# Patient Record
Sex: FEMALE | Race: WHITE | NOT HISPANIC OR LATINO | Employment: OTHER | URBAN - METROPOLITAN AREA
[De-identification: names, ages, dates, MRNs, and addresses within clinical notes are randomized per-mention and may not be internally consistent; named-entity substitution may affect disease eponyms.]

---

## 2017-08-03 ENCOUNTER — TRANSCRIBE ORDERS (OUTPATIENT)
Dept: ADMINISTRATIVE | Facility: HOSPITAL | Age: 42
End: 2017-08-03

## 2017-08-03 DIAGNOSIS — Z12.31 VISIT FOR SCREENING MAMMOGRAM: Primary | ICD-10-CM

## 2020-01-05 ENCOUNTER — OFFICE VISIT (OUTPATIENT)
Dept: URGENT CARE | Age: 45
End: 2020-01-05
Payer: MEDICARE

## 2020-01-05 VITALS
HEART RATE: 75 BPM | HEIGHT: 66 IN | DIASTOLIC BLOOD PRESSURE: 76 MMHG | RESPIRATION RATE: 18 BRPM | WEIGHT: 184.6 LBS | OXYGEN SATURATION: 95 % | BODY MASS INDEX: 29.67 KG/M2 | SYSTOLIC BLOOD PRESSURE: 115 MMHG | TEMPERATURE: 98.5 F

## 2020-01-05 DIAGNOSIS — B96.89 ACUTE BACTERIAL BRONCHITIS: Primary | ICD-10-CM

## 2020-01-05 DIAGNOSIS — J20.8 ACUTE BACTERIAL BRONCHITIS: Primary | ICD-10-CM

## 2020-01-05 PROCEDURE — G0463 HOSPITAL OUTPT CLINIC VISIT: HCPCS | Performed by: PHYSICIAN ASSISTANT

## 2020-01-05 PROCEDURE — 99213 OFFICE O/P EST LOW 20 MIN: CPT | Performed by: PHYSICIAN ASSISTANT

## 2020-01-05 RX ORDER — HYDROXYZINE HYDROCHLORIDE 25 MG/1
25-50 TABLET, FILM COATED ORAL EVERY 6 HOURS PRN
COMMUNITY
Start: 2019-11-27

## 2020-01-05 RX ORDER — BENZONATATE 200 MG/1
200 CAPSULE ORAL 3 TIMES DAILY PRN
Qty: 20 CAPSULE | Refills: 0 | Status: SHIPPED | OUTPATIENT
Start: 2020-01-05

## 2020-01-05 RX ORDER — LEVOTHYROXINE SODIUM 137 UG/1
TABLET ORAL
COMMUNITY
Start: 2019-10-21

## 2020-01-05 RX ORDER — ARIPIPRAZOLE 5 MG/1
5 TABLET ORAL DAILY
COMMUNITY
Start: 2019-11-27 | End: 2022-07-06

## 2020-01-05 RX ORDER — DICYCLOMINE HCL 20 MG
20 TABLET ORAL 3 TIMES DAILY
COMMUNITY
Start: 2019-12-11

## 2020-01-05 RX ORDER — METOPROLOL SUCCINATE 25 MG/1
25 TABLET, EXTENDED RELEASE ORAL 2 TIMES DAILY
COMMUNITY
Start: 2019-10-25 | End: 2022-07-06

## 2020-01-05 RX ORDER — CLONAZEPAM 0.12 MG/1
TABLET, ORALLY DISINTEGRATING ORAL
COMMUNITY
Start: 2019-11-27 | End: 2022-03-25

## 2020-01-05 RX ORDER — PREDNISONE 20 MG/1
40 TABLET ORAL DAILY
Qty: 10 TABLET | Refills: 0 | Status: SHIPPED | OUTPATIENT
Start: 2020-01-05 | End: 2020-01-10

## 2020-01-05 RX ORDER — FLECAINIDE ACETATE 50 MG/1
150 TABLET ORAL
COMMUNITY
Start: 2019-07-11

## 2020-01-05 RX ORDER — PREGABALIN 100 MG/1
CAPSULE ORAL
COMMUNITY
Start: 2019-12-08 | End: 2022-03-25

## 2020-01-05 RX ORDER — ALBUTEROL SULFATE 90 UG/1
2 AEROSOL, METERED RESPIRATORY (INHALATION) EVERY 6 HOURS PRN
Qty: 18 G | Refills: 0 | Status: SHIPPED | OUTPATIENT
Start: 2020-01-05

## 2020-01-05 RX ORDER — AMOXICILLIN AND CLAVULANATE POTASSIUM 875; 125 MG/1; MG/1
1 TABLET, FILM COATED ORAL EVERY 12 HOURS SCHEDULED
Qty: 14 TABLET | Refills: 0 | Status: SHIPPED | OUTPATIENT
Start: 2020-01-05 | End: 2020-01-12

## 2020-01-05 RX ORDER — PANTOPRAZOLE SODIUM 40 MG/1
1 TABLET, DELAYED RELEASE ORAL DAILY
COMMUNITY
Start: 2018-09-25

## 2020-01-05 NOTE — PATIENT INSTRUCTIONS
Continue to monitor symptoms  If new or worsening symptoms develop, go immediately to Er  Drink plenty of fluids  Follow up with Family Doctor this week  Acute Bronchitis   WHAT YOU NEED TO KNOW:   Acute bronchitis is swelling and irritation in the air passages of your lungs  This irritation may cause you to cough or have other breathing problems  Acute bronchitis often starts because of another illness, such as a cold or the flu  The illness spreads from your nose and throat to your windpipe and airways  Bronchitis is often called a chest cold  Acute bronchitis lasts about 3 to 6 weeks and is usually not a serious illness  Your cough can last for several weeks  DISCHARGE INSTRUCTIONS:   Return to the emergency department if:   · You cough up blood  · Your lips or fingernails turn blue  · You feel like you are not getting enough air when you breathe  Contact your healthcare provider if:   · You have a fever  · Your breathing problems do not go away or get worse  · Your cough does not get better within 4 weeks  · You have questions or concerns about your condition or care  Self-care:   · Get more rest   Rest helps your body to heal  Slowly start to do more each day  Rest when you feel it is needed  · Avoid irritants in the air  Avoid chemicals, fumes, and dust  Wear a face mask if you must work around dust or fumes  Stay inside on days when air pollution levels are high  If you have allergies, stay inside when pollen counts are high  Do not use aerosol products, such as spray-on deodorant, bug spray, and hair spray  · Do not smoke or be around others who smoke  Nicotine and other chemicals in cigarettes and cigars damages the cilia that move mucus out of your lungs  Ask your healthcare provider for information if you currently smoke and need help to quit  E-cigarettes or smokeless tobacco still contain nicotine  Talk to your healthcare provider before you use these products       · Drink liquids as directed  Liquids help keep your air passages moist and help you cough up mucus  You may need to drink more liquids when you have acute bronchitis  Ask how much liquid to drink each day and which liquids are best for you  · Use a humidifier or vaporizer  Use a cool mist humidifier or a vaporizer to increase air moisture in your home  This may make it easier for you to breathe and help decrease your cough  Decrease risk for acute bronchitis:   · Get the vaccinations you need  Ask your healthcare provider if you should get vaccinated against the flu or pneumonia  · Prevent the spread of germs  You can decrease your risk of acute bronchitis and other illnesses by doing the following:     [facility] [place] often [place] soap and water  Carry germ-killing hand lotion or gel with you  You can use the lotion or gel to clean your hands when soap and water are not available  ¨ Do not touch your eyes, nose, or mouth unless you have washed your hands first     ¨ Always cover your mouth when you cough to prevent the spread of germs  It is best to cough into a tissue or your shirt sleeve instead of into your hand  Ask those around you cover their mouths when they cough  ¨ Try to avoid people who have a cold or the flu  If you are sick, stay away from others as much as possible  Medicines: Your healthcare provider may  give you any of the following:  · Ibuprofen or acetaminophen  are medicines that help lower your fever  They are available without a doctor's order  Ask your healthcare provider which medicine is right for you  Ask how much to take and how often to take it  Follow directions  These medicines can cause stomach bleeding if not taken correctly  Ibuprofen can cause kidney damage  Do not take ibuprofen if you have kidney disease, an ulcer, or allergies to aspirin  Acetaminophen can cause liver damage  Do not take more than 4,000 milligrams in 24 hours       · Decongestants  help loosen mucus in your lungs and make it easier to cough up  This can help you breathe easier  · Cough suppressants  decrease your urge to cough  If your cough produces mucus, do not take a cough suppressant unless your healthcare provider tells you to  Your healthcare provider may suggest that you take a cough suppressant at night so you can rest     · Inhalers  may be given  Your healthcare provider may give you one or more inhalers to help you breathe easier and cough less  An inhaler gives your medicine to open your airways  Ask your healthcare provider to show you how to use your inhaler correctly  · Take your medicine as directed  Contact your healthcare provider if you think your medicine is not helping or if you have side effects  Tell him of her if you are allergic to any medicine  Keep a list of the medicines, vitamins, and herbs you take  Include the amounts, and when and why you take them  Bring the list or the pill bottles to follow-up visits  Carry your medicine list with you in case of an emergency  Follow up with your healthcare provider as directed:  Write down questions you have so you will remember to ask them during your follow-up visits  © 2017 [employer] Information is for End User's use only and may not be sold, redistributed or otherwise used for commercial purposes  All illustrations and images included in CareNotes® are the copyrighted property of [employer]  or Malachi Barbosa  The above information is an  only  It is not intended as medical advice for individual conditions or treatments  Talk to your doctor, nurse or pharmacist before following any medical regimen to see if it is safe and effective for you

## 2020-01-05 NOTE — PROGRESS NOTES
[facility]        NAME: Ernesto Macdonald is a 53 y o  female  : 1975    MRN: [id number]  DATE: 2020  TIME: 2:59 PM    Assessment and Plan   Acute bacterial bronchitis [J20 8, B96 89]  1  Acute bacterial bronchitis  amoxicillin-clavulanate (AUGMENTIN) 875-125 mg per tablet    predniSONE 20 mg tablet    albuterol (VENTOLIN HFA) 90 mcg/act inhaler    benzonatate (TESSALON) 200 MG capsule         Patient Instructions       Continue to monitor symptoms  If new or worsening symptoms develop, go immediately to Er  Drink plenty of fluids  Follow up with Family Doctor this week  Chief Complaint     Chief Complaint   Patient presents with    Cold Like Symptoms     Patient reports body aches, chest congestion, moist productive cough, symptom onset Thursday  No fevers, nausea or vomiting  APAP, advil OTC AT 1000          History of Present Illness       Cough   This is a new problem  Episode onset: 3-4 days  The problem has been gradually worsening  The problem occurs every few minutes  The cough is non-productive  Associated symptoms include postnasal drip and a sore throat  Pertinent negatives include no chest pain, chills, ear pain, fever, headaches, myalgias, rash, rhinorrhea, shortness of breath or wheezing  Risk factors for lung disease include smoking/tobacco exposure (>30 years)  Treatments tried: tylenol, advil  The treatment provided mild relief  Review of Systems   Review of Systems   Constitutional: Negative for chills, diaphoresis, fatigue and fever  HENT: Positive for postnasal drip, sinus pressure, sinus pain, sneezing and sore throat  Negative for congestion, ear pain, rhinorrhea and voice change  Eyes: Negative  Respiratory: Positive for cough  Negative for chest tightness, shortness of breath and wheezing  Cardiovascular: Negative for chest pain and palpitations  Gastrointestinal: Negative for abdominal pain, constipation, diarrhea, nausea and vomiting  Endocrine: Negative  Genitourinary: Negative for dysuria  Musculoskeletal: Negative for back pain, myalgias and neck pain  Skin: Negative for pallor and rash  Allergic/Immunologic: Negative  Neurological: Negative for dizziness, syncope and headaches  Hematological: Negative  Psychiatric/Behavioral: Negative  Current Medications       Current Outpatient Medications:     ARIPiprazole (ABILIFY) 5 mg tablet, Take 5 mg by mouth daily, Disp: , Rfl:     asenapine (SAPHRIS) 5 mg SL tablet, Place 5 mg under the tongue daily at bedtime  , Disp: , Rfl:     baclofen 10 mg tablet, Take 10 mg by mouth 3 (three) times a day , Disp: , Rfl:     clonazePAM (KlonoPIN) 0 125 mg disintegrating tablet, place 1 tablet by mouth NIGHTLY AS NEEDED FOR SEIZURES, Disp: , Rfl:     cycloSPORINE (RESTASIS) 0 05 % ophthalmic emulsion, Administer 1 drop to both eyes 2 (two) times a day , Disp: , Rfl:     dicyclomine (BENTYL) 20 mg tablet, Take 20 mg by mouth 3 (three) times a day, Disp: , Rfl:     flecainide (TAMBOCOR) 50 mg tablet, take 1 tablet by mouth twice a day, Disp: , Rfl:     gabapentin (NEURONTIN) 300 mg capsule, Take 300 mg by mouth 3 (three) times a day , Disp: , Rfl:     hydrOXYzine HCL (ATARAX) 25 mg tablet, Take 25-50 mg by mouth every 6 (six) hours as needed, Disp: , Rfl:     levothyroxine 137 mcg tablet, , Disp: , Rfl:     metoprolol succinate (TOPROL-XL) 25 mg 24 hr tablet, Take 25 mg by mouth 2 (two) times a day, Disp: , Rfl:     oxyCODONE-acetaminophen (PERCOCET) 7 5-325 MG per tablet, Take 1 tablet by mouth every 6 (six) hours as needed for moderate pain , Disp: , Rfl:     pantoprazole (PROTONIX) 40 mg tablet, Take 1 tablet by mouth daily, Disp: , Rfl:     pregabalin (LYRICA) 150 mg capsule, Take 150 mg by mouth 2 (two) times a day , Disp: , Rfl:     topiramate (TOPAMAX) 100 mg tablet, Take 100 mg by mouth 2 (two) times a day , Disp: , Rfl:     venlafaxine (EFFEXOR-XR) 150 mg 24 hr capsule, Take 150 mg by mouth daily  , Disp: , Rfl:     venlafaxine (EFFEXOR-XR) 37 5 mg 24 hr capsule, Take 37 5 mg by mouth daily  , Disp: , Rfl:     ZOLMitriptan (ZOMIG) 5 MG tablet, Take 5 mg by mouth once as needed for migraine  May repeat in 2 hours if unresolved  Do not exceed 10 mg in 24 hours  , Disp: , Rfl:     zolpidem (AMBIEN CR) 12 5 MG CR tablet, Take 10 mg by mouth daily at bedtime as needed for sleep , Disp: , Rfl:     albuterol (VENTOLIN HFA) 90 mcg/act inhaler, Inhale 2 puffs every 6 (six) hours as needed for wheezing, Disp: 18 g, Rfl: 0    amoxicillin-clavulanate (AUGMENTIN) 875-125 mg per tablet, Take 1 tablet by mouth every 12 (twelve) hours for 7 days, Disp: 14 tablet, Rfl: 0    benzonatate (TESSALON) 200 MG capsule, Take 1 capsule (200 mg total) by mouth 3 (three) times a day as needed for cough, Disp: 20 capsule, Rfl: 0    clonazePAM (KlonoPIN) 1 mg tablet, Take 1 mg by mouth 3 (three) times a day as needed for seizures  , Disp: , Rfl:     levothyroxine 100 mcg tablet, Take 112 mcg by mouth daily  , Disp: , Rfl:     morphine (MS CONTIN) 15 mg 12 hr tablet, Take 15 mg by mouth 2 (two) times a day , Disp: , Rfl:     predniSONE 20 mg tablet, Take 2 tablets (40 mg total) by mouth daily for 5 days, Disp: 10 tablet, Rfl: 0    pregabalin (LYRICA) 100 mg capsule, TAKE 1 CAPSULE BY MOUTH 2 TIMES DAILY  , Disp: , Rfl:     Current Allergies     Allergies as of 01/05/2020 - Reviewed 01/05/2020   Allergen Reaction Noted    Pollen extract  08/16/2017            The following portions of the patient's history were reviewed and updated as appropriate: allergies, current medications, past family history, past medical history, past social history, past surgical history and problem list      Past Medical History:   Diagnosis Date    Disease of thyroid gland     Inflammatory bowel disease        Past Surgical History:   Procedure Laterality Date    APPENDECTOMY      TOTAL THYROIDECTOMY         History reviewed  No pertinent family history  Medications have been verified  Objective   /76   Pulse 75   Temp 98 5 °F (36 9 °C) (Tympanic)   Resp 18   Ht 5' 6" (1 676 m)   Wt 83 7 kg (184 lb 9 6 oz)   SpO2 95%   BMI 29 80 kg/m²        Physical Exam     Physical Exam   Constitutional: She appears well-developed and well-nourished  No distress  HENT:   Head: Normocephalic and atraumatic  Right Ear: Hearing, external ear and ear canal normal  Tympanic membrane is bulging  Tympanic membrane is not perforated and not erythematous  Left Ear: Hearing, external ear and ear canal normal  Tympanic membrane is bulging  Tympanic membrane is not perforated and not erythematous  Nose: Mucosal edema present  Right sinus exhibits no maxillary sinus tenderness and no frontal sinus tenderness  Left sinus exhibits no maxillary sinus tenderness and no frontal sinus tenderness  Mouth/Throat: Posterior oropharyngeal erythema present  No oropharyngeal exudate or posterior oropharyngeal edema  Eyes: Conjunctivae are normal  Right eye exhibits no discharge  Left eye exhibits no discharge  Neck: Normal range of motion  Neck supple  Cardiovascular: Normal rate, regular rhythm, normal heart sounds and intact distal pulses  Pulmonary/Chest: Effort normal and breath sounds normal  No respiratory distress  She has no wheezes  She has no rales  Lymphadenopathy:     She has no cervical adenopathy  Skin: Skin is warm  No rash noted  She is not diaphoretic  Nursing note and vitals reviewed

## 2020-08-31 ENCOUNTER — HOSPITAL ENCOUNTER (OUTPATIENT)
Dept: RADIOLOGY | Facility: HOSPITAL | Age: 45
Discharge: HOME | End: 2020-08-31
Payer: MEDICARE

## 2020-08-31 DIAGNOSIS — K59.00 CONSTIPATION, UNSPECIFIED CONSTIPATION TYPE: ICD-10-CM

## 2020-08-31 PROCEDURE — 74022 RADEX COMPL AQT ABD SERIES: CPT

## 2020-09-03 ENCOUNTER — TELEPHONE (OUTPATIENT)
Dept: OTHER | Facility: OTHER | Age: 45
End: 2020-09-03

## 2020-09-03 NOTE — TELEPHONE ENCOUNTER
[facility] 1975/ Pt is experiencing abdominal pain  Has been for 2 weeks    Contacted via TC

## 2021-04-03 ENCOUNTER — TRANSCRIBE ORDERS (OUTPATIENT)
Dept: RADIOLOGY | Age: 46
End: 2021-04-03

## 2021-04-03 ENCOUNTER — APPOINTMENT (OUTPATIENT)
Dept: RADIOLOGY | Age: 46
End: 2021-04-03
Payer: MEDICARE

## 2021-04-03 DIAGNOSIS — M79.641 RIGHT HAND PAIN: ICD-10-CM

## 2021-04-03 DIAGNOSIS — M79.642 LEFT HAND PAIN: ICD-10-CM

## 2021-04-03 DIAGNOSIS — M79.642 LEFT HAND PAIN: Primary | ICD-10-CM

## 2021-04-03 PROCEDURE — 73130 X-RAY EXAM OF HAND: CPT

## 2021-10-12 ENCOUNTER — OFFICE VISIT (OUTPATIENT)
Dept: URGENT CARE | Age: 46
End: 2021-10-12
Payer: MEDICARE

## 2021-10-12 VITALS — BODY MASS INDEX: 22.76 KG/M2 | WEIGHT: 145 LBS | HEIGHT: 67 IN

## 2021-10-12 DIAGNOSIS — B34.9 ACUTE VIRAL SYNDROME: Primary | ICD-10-CM

## 2021-10-12 PROCEDURE — U0003 INFECTIOUS AGENT DETECTION BY NUCLEIC ACID (DNA OR RNA); SEVERE ACUTE RESPIRATORY SYNDROME CORONAVIRUS 2 (SARS-COV-2) (CORONAVIRUS DISEASE [COVID-19]), AMPLIFIED PROBE TECHNIQUE, MAKING USE OF HIGH THROUGHPUT TECHNOLOGIES AS DESCRIBED BY CMS-2020-01-R: HCPCS | Performed by: PHYSICIAN ASSISTANT

## 2021-10-12 PROCEDURE — U0005 INFEC AGEN DETEC AMPLI PROBE: HCPCS | Performed by: PHYSICIAN ASSISTANT

## 2021-10-12 PROCEDURE — 99213 OFFICE O/P EST LOW 20 MIN: CPT | Performed by: PHYSICIAN ASSISTANT

## 2021-10-12 PROCEDURE — G0463 HOSPITAL OUTPT CLINIC VISIT: HCPCS | Performed by: PHYSICIAN ASSISTANT

## 2021-10-13 LAB — SARS-COV-2 RNA RESP QL NAA+PROBE: NEGATIVE

## 2021-12-07 ENCOUNTER — OFFICE VISIT (OUTPATIENT)
Dept: URGENT CARE | Age: 46
End: 2021-12-07
Payer: MEDICARE

## 2021-12-07 VITALS
HEIGHT: 67 IN | OXYGEN SATURATION: 97 % | WEIGHT: 165 LBS | TEMPERATURE: 98.6 F | HEART RATE: 88 BPM | BODY MASS INDEX: 25.9 KG/M2 | RESPIRATION RATE: 17 BRPM

## 2021-12-07 DIAGNOSIS — J01.40 ACUTE NON-RECURRENT PANSINUSITIS: Primary | ICD-10-CM

## 2021-12-07 PROCEDURE — 99213 OFFICE O/P EST LOW 20 MIN: CPT | Performed by: PHYSICIAN ASSISTANT

## 2021-12-07 PROCEDURE — 0241U HB NFCT DS VIR RESP RNA 4 TRGT: CPT | Performed by: PHYSICIAN ASSISTANT

## 2021-12-07 PROCEDURE — G0463 HOSPITAL OUTPT CLINIC VISIT: HCPCS | Performed by: PHYSICIAN ASSISTANT

## 2021-12-07 RX ORDER — PREDNISONE 10 MG/1
TABLET ORAL
Qty: 15 TABLET | Refills: 0 | Status: SHIPPED | OUTPATIENT
Start: 2021-12-07 | End: 2021-12-12

## 2021-12-07 RX ORDER — AMOXICILLIN AND CLAVULANATE POTASSIUM 875; 125 MG/1; MG/1
1 TABLET, FILM COATED ORAL EVERY 12 HOURS SCHEDULED
Qty: 14 TABLET | Refills: 0 | Status: SHIPPED | OUTPATIENT
Start: 2021-12-07 | End: 2021-12-14

## 2021-12-07 RX ORDER — BROMPHENIRAMINE MALEATE, PSEUDOEPHEDRINE HYDROCHLORIDE, AND DEXTROMETHORPHAN HYDROBROMIDE 2; 30; 10 MG/5ML; MG/5ML; MG/5ML
10 SYRUP ORAL 4 TIMES DAILY PRN
Qty: 120 ML | Refills: 0 | Status: SHIPPED | OUTPATIENT
Start: 2021-12-07

## 2021-12-08 LAB
FLUAV RNA RESP QL NAA+PROBE: NEGATIVE
FLUBV RNA RESP QL NAA+PROBE: NEGATIVE
RSV RNA RESP QL NAA+PROBE: NEGATIVE
SARS-COV-2 RNA RESP QL NAA+PROBE: NEGATIVE

## 2022-03-25 ENCOUNTER — CONSULT (OUTPATIENT)
Dept: PAIN MEDICINE | Facility: CLINIC | Age: 47
End: 2022-03-25
Payer: MEDICARE

## 2022-03-25 VITALS
HEIGHT: 67 IN | WEIGHT: 175 LBS | HEART RATE: 68 BPM | OXYGEN SATURATION: 98 % | BODY MASS INDEX: 27.47 KG/M2 | SYSTOLIC BLOOD PRESSURE: 128 MMHG | DIASTOLIC BLOOD PRESSURE: 84 MMHG

## 2022-03-25 DIAGNOSIS — F31.9 BIPOLAR 1 DISORDER (HCC): ICD-10-CM

## 2022-03-25 DIAGNOSIS — Z79.891 LONG-TERM CURRENT USE OF OPIATE ANALGESIC: ICD-10-CM

## 2022-03-25 DIAGNOSIS — G89.4 CHRONIC PAIN SYNDROME: Primary | ICD-10-CM

## 2022-03-25 DIAGNOSIS — M48.062 SPINAL STENOSIS OF LUMBAR REGION WITH NEUROGENIC CLAUDICATION: ICD-10-CM

## 2022-03-25 DIAGNOSIS — M51.16 LUMBAR DISC DISEASE WITH RADICULOPATHY: ICD-10-CM

## 2022-03-25 PROCEDURE — 99204 OFFICE O/P NEW MOD 45 MIN: CPT | Performed by: PHYSICAL MEDICINE & REHABILITATION

## 2022-03-25 RX ORDER — PREGABALIN 150 MG/1
150 CAPSULE ORAL 2 TIMES DAILY
Qty: 60 CAPSULE | Refills: 1 | Status: SHIPPED | OUTPATIENT
Start: 2022-03-25 | End: 2022-05-19 | Stop reason: SDUPTHER

## 2022-03-25 NOTE — PATIENT INSTRUCTIONS
Lumbar Spinal Stenosis   WHAT YOU NEED TO KNOW:   Lumbar spinal stenosis is narrowing of the spinal canal in your lower back  Your spinal canal holds your spinal cord  The spinal cord controls your ability to move  When your spinal canal narrows, it may put pressure on your spinal cord  DISCHARGE INSTRUCTIONS:   Return to the emergency department if:   · You have pain in your leg that does not go away or gets worse  · You have trouble moving your legs  · You cannot control when you urinate or have a bowel movement  Contact your healthcare provider if:   · You have new or worsening symptoms  · Your symptoms keep you from doing your daily activities  · You have questions or concerns about your condition or care  Medicines:   · NSAIDs , such as ibuprofen, help decrease swelling, pain, and fever  NSAIDs can cause stomach bleeding or kidney problems in certain people  If you take blood thinner medicine, always ask your healthcare provider if NSAIDs are safe for you  Always read the medicine label and follow directions  · Acetaminophen  decreases pain and fever  It is available without a doctor's order  Ask how much to take and how often to take it  Follow directions  Read the labels of all other medicines you are using to see if they also contain acetaminophen, or ask your doctor or pharmacist  Acetaminophen can cause liver damage if not taken correctly  Do not use more than 4 grams (4,000 milligrams) total of acetaminophen in one day  · Prescription pain medicine  may be given  Ask how to take this medicine safely  · Muscle relaxers  help decrease pain and muscle spasms  · Take your medicine as directed  Contact your healthcare provider if you think your medicine is not helping or if you have side effects  Tell him or her if you are allergic to any medicine  Keep a list of the medicines, vitamins, and herbs you take  Include the amounts, and when and why you take them   Bring the list or the pill bottles to follow-up visits  Carry your medicine list with you in case of an emergency  Self-care:   · Rest  when you feel it is needed  Slowly start to do more each day  Return to your daily activities as directed  · Apply heat on your back for 20 to 30 minutes every 2 hours for as many days as directed  Heat helps decrease pain and muscle spasms  · Apply ice  on your back for 15 to 20 minutes every hour or as directed  Use an ice pack, or put crushed ice in a plastic bag  Cover it with a towel before you apply it to your skin  Ice helps prevent tissue damage and decreases swelling and pain  Go to physical and occupational therapy as directed:  A physical therapist teaches you exercises to help improve movement and strength, and to decrease pain  An occupational therapist teaches you skills to help with your daily activities  Follow up with your doctor as directed:  Write down your questions so you remember to ask them during your visits  © Copyright [employer] 2022 Information is for End User's use only and may not be sold, redistributed or otherwise used for commercial purposes  All illustrations and images included in CareNotes® are the copyrighted property of [employer]  or Kiran Diallo   The above information is an  only  It is not intended as medical advice for individual conditions or treatments  Talk to your doctor, nurse or pharmacist before following any medical regimen to see if it is safe and effective for you

## 2022-03-25 NOTE — PROGRESS NOTES
Assessment  1  Chronic pain syndrome    2  Spinal stenosis of lumbar region with neurogenic claudication    3  Lumbar disc disease with radiculopathy    4  Long-term current use of opiate analgesic    5  Bipolar 1 disorder [facility])        Plan  Ms Caterina Vizcaino is a pleasant 78-year-old female who presents for initial evaluation regarding chronic low back pain with intermittent radiating symptoms into bilateral lower extremities  She was previously being seen by Dr Claribel Castro who provided the patient with interventional approach to manage her pain as well as opiate medication management  During today's evaluation she continues to demonstrate clinical and diagnostic evidence of lumbar radiculopathy into bilateral lower extremities with MRI evidence from 2017 of multilevel degenerative disc disease, facet arthropathy, mild spinal stenosis at L4-L5 and myeloma bilateral foraminal stenosis at L5-S1  During today's evaluation extensive conversation regarding medication and interventional approach to manage her pain were discussed  Patient is aware I will not be taking over her opiate medications  At this time we will   1  Increase Lyrica to 150 mg twice a day   2  Will order MRI lumbar spine without contrast under anesthesia  Last MRI was done in 2017 demonstrating mild lumbar spinal stenosis  Patient has undergone greater than 6 weeks of chiropractic manipulation in the last 6 months with minimal relief  3  Would likely benefit from interventional approaches including epidural steroid injections but for now we will await MRI results    My impressions and treatment recommendations were discussed in detail with the patient who verbalized understanding and had no further questions  Discharge instructions were provided  I personally saw and examined the patient and I agree with the above discussed plan of care      Orders Placed This Encounter   Procedures    MRI lumbar spine without contrast     Standing Status:   Future Standing Expiration Date:   3/25/2026     Scheduling Instructions: There is no preparation for this test  Please leave your jewelry and valuables at home, wedding rings are the exception  Magnetic nail polish must be removed prior to arrival for your test  Please bring your insurance cards, a form of photo ID and a list of your medications with you  Arrive 15 minutes prior to your appointment time in order to register  Please bring any prior CT or MRI studies of this area that were not performed at a [facility]  To schedule this appointment, please contact Central Scheduling at [phone]  Prior to your appointment, please make sure you complete the MRI Screening Form when you e-Check in for your appointment  This will be available starting 7 days before your appointment in [address]  You may receive an e-mail with an activation code if you do not have a [email or web address] account  If you do not have access to a device, we will complete your screening at your appointment  Order Specific Question:   What is the patient's sedation requirement? Answer: Anesthesia     Order Specific Question:   Is the patient pregnant? Answer:   No     Order Specific Question:   Release to patient through [employer]     Answer:   Immediate     Order Specific Question:   Is order priority selected as STAT? Answer:   No     Order Specific Question:   Reason for Exam (FREE TEXT)     Answer:   lumbar spinal stenosis    Comprehensive metabolic panel     This is a patient instruction: Patient fasting for 8 hours or longer recommended       Standing Status:   Future     Standing Expiration Date:   3/25/2023     New Medications Ordered This Visit   Medications    pregabalin (LYRICA) 150 mg capsule     Sig: Take 1 capsule (150 mg total) by mouth 2 (two) times a day     Dispense:  60 capsule     Refill:  1       History of Present Illness    Claire Hauser is a 50 y o  female presents to [address] [place] [address] [facility] for initial evaluation regarding 10 years plus duration of mid to low back pain with symptoms in bilateral feet  Patient was previously following with Dr Martine Jernigan for pain management  Today reports moderate to severe pain rated 7/10 and interfering with daily activities  Pain is nearly constant 60-95% of the time that is worse in the afternoon and evening  Describes symptoms as burning, shooting, numbness, sharp, pins needles, throbbing, dull aching pain  Also reports lower extremity weakness but denies falls  Does not use any durable medical equipment for ambulation  Symptoms are worse with standing, bending, sitting, walking  Has had moderate relief with heat, biofeedback, 10s unit, chiropractic manipulation, nerve blocks and no significant improvements with physical therapy  Admits to social alcohol as well as smoking a pack per day for the last 15 years  Previously tried MS Contin, Vicodin, Percocet, tramadol with some relief in her pain presents today for initial evaluation  I have personally reviewed and/or updated the patient's past medical history, past surgical history, family history, social history, current medications, allergies, and vital signs today  Review of Systems   Constitutional: Negative for fever and unexpected weight change  HENT: Negative for trouble swallowing  Eyes: Negative for visual disturbance  Respiratory: Negative for shortness of breath and wheezing  Cardiovascular: Negative for chest pain and palpitations  Gastrointestinal: Positive for abdominal pain  Negative for constipation, diarrhea, nausea and vomiting  Endocrine: Negative for cold intolerance, heat intolerance and polydipsia  Genitourinary: Negative for difficulty urinating and frequency  Musculoskeletal: Positive for arthralgias, back pain, gait problem and myalgias  Negative for joint swelling  Skin: Negative for rash     Neurological: Negative for dizziness, seizures, syncope, weakness and headaches  Hematological: Does not bruise/bleed easily  Psychiatric/Behavioral: Negative for dysphoric mood  All other systems reviewed and are negative  Patient Active Problem List   Diagnosis    Bipolar 1 disorder ([facility] )       Past Medical History:   Diagnosis Date    Anxiety     Bipolar 1 disorder ([facility] )     Depression     Disease of thyroid gland     Fibromyalgia, primary     Inflammatory bowel disease     Thyroid disease        Past Surgical History:   Procedure Laterality Date    APPENDECTOMY      KNEE SURGERY      TOTAL THYROIDECTOMY         History reviewed  No pertinent family history  Social History     Occupational History    Not on file   Tobacco Use    Smoking status: Current Every Day Smoker     Packs/day: 0 50     Types: Cigarettes    Smokeless tobacco: Never Used   Vaping Use    Vaping Use: Never used   Substance and Sexual Activity    Alcohol use: Yes     Comment: socially     Drug use: No    Sexual activity: Not on file       Current Outpatient Medications on File Prior to Visit   Medication Sig    asenapine (SAPHRIS) 5 mg SL tablet Place 5 mg under the tongue daily at bedtime   baclofen 10 mg tablet Take 10 mg by mouth 3 (three) times a day   brompheniramine-pseudoephedrine-DM 30-2-10 MG/5ML syrup Take 10 mL by mouth 4 (four) times a day as needed for congestion or cough    cycloSPORINE (RESTASIS) 0 05 % ophthalmic emulsion Administer 1 drop to both eyes 2 (two) times a day   dicyclomine (BENTYL) 20 mg tablet Take 20 mg by mouth 3 (three) times a day    flecainide (TAMBOCOR) 50 mg tablet 150 mg     hydrOXYzine HCL (ATARAX) 25 mg tablet Take 25-50 mg by mouth every 6 (six) hours as needed    hyoscyamine (LEVSIN/SL) 0 125 mg SL tablet Take 1 tablet (0 125 mg total) by mouth every 6 (six) hours as needed for cramping    levothyroxine 100 mcg tablet Take 112 mcg by mouth daily      levothyroxine 137 mcg tablet     oxyCODONE-acetaminophen (PERCOCET) 7 5-325 MG per tablet Take 1 tablet by mouth every 6 (six) hours as needed for moderate pain   pantoprazole (PROTONIX) 40 mg tablet Take 1 tablet by mouth daily    topiramate (TOPAMAX) 100 mg tablet Take 100 mg by mouth 2 (two) times a day   venlafaxine (EFFEXOR-XR) 150 mg 24 hr capsule Take 150 mg by mouth daily   venlafaxine (EFFEXOR-XR) 37 5 mg 24 hr capsule Take 37 5 mg by mouth daily   ZOLMitriptan (ZOMIG) 5 MG tablet Take 5 mg by mouth once as needed for migraine  May repeat in 2 hours if unresolved  Do not exceed 10 mg in 24 hours   zolpidem (AMBIEN CR) 12 5 MG CR tablet Take 10 mg by mouth daily at bedtime as needed for sleep   [DISCONTINUED] clonazePAM (KlonoPIN) 0 125 mg disintegrating tablet place 1 tablet by mouth NIGHTLY AS NEEDED FOR SEIZURES    [DISCONTINUED] clonazePAM (KlonoPIN) 1 mg tablet Take 1 mg by mouth 3 (three) times a day as needed for seizures   [DISCONTINUED] gabapentin (NEURONTIN) 300 mg capsule Take 300 mg by mouth 3 (three) times a day   [DISCONTINUED] morphine (MS CONTIN) 15 mg 12 hr tablet Take 15 mg by mouth 2 (two) times a day   [DISCONTINUED] pregabalin (LYRICA) 100 mg capsule TAKE 1 CAPSULE BY MOUTH 2 TIMES DAILY   [DISCONTINUED] pregabalin (LYRICA) 150 mg capsule Take 150 mg by mouth 2 (two) times a day   albuterol (VENTOLIN HFA) 90 mcg/act inhaler Inhale 2 puffs every 6 (six) hours as needed for wheezing (Patient not taking: Reported on 9/28/2020)    ARIPiprazole (ABILIFY) 5 mg tablet Take 5 mg by mouth daily    benzonatate (TESSALON) 200 MG capsule Take 1 capsule (200 mg total) by mouth 3 (three) times a day as needed for cough (Patient not taking: Reported on 8/25/2020)    metoprolol succinate (TOPROL-XL) 25 mg 24 hr tablet Take 25 mg by mouth 2 (two) times a day     No current facility-administered medications on file prior to visit         Allergies   Allergen Reactions    Pollen Extract Physical Exam    /84   Pulse 68   Ht 5' 6 5" (1 689 m)   Wt 79 4 kg (175 lb)   SpO2 98%   BMI 27 82 kg/m²     Constitutional: normal, well developed, well nourished, alert, in no distress and non-toxic and no overt pain behavior    Eyes: anicteric  HEENT: grossly intact  Neck: supple, symmetric, trachea midline and no masses   Pulmonary:even and unlabored  Cardiovascular:No edema or pitting edema present  Skin:Normal without rashes or lesions and well hydrated  Psychiatric:Mood and affect appropriate  Neurologic:Cranial Nerves II-XII grossly intact  Musculoskeletal:antalgic, tenderness to palpation bilateral lumbar paraspinals, decreased active and passive range of motion with lumbar flexion and extension limited by pain, MMT 5/5 bilateral lower extremities, sensation decreased to light touch in patchy distribution left lower extremity, positive straight leg raise in the seated position with radicular pain into the left leg    Imaging

## 2022-05-19 ENCOUNTER — TELEPHONE (OUTPATIENT)
Dept: PAIN MEDICINE | Facility: CLINIC | Age: 47
End: 2022-05-19

## 2022-05-19 DIAGNOSIS — M51.16 LUMBAR DISC DISEASE WITH RADICULOPATHY: ICD-10-CM

## 2022-05-19 DIAGNOSIS — M48.062 SPINAL STENOSIS OF LUMBAR REGION WITH NEUROGENIC CLAUDICATION: ICD-10-CM

## 2022-05-19 DIAGNOSIS — G89.4 CHRONIC PAIN SYNDROME: ICD-10-CM

## 2022-05-19 DIAGNOSIS — Z79.891 LONG-TERM CURRENT USE OF OPIATE ANALGESIC: ICD-10-CM

## 2022-05-19 RX ORDER — PREGABALIN 150 MG/1
150 CAPSULE ORAL 2 TIMES DAILY
Qty: 60 CAPSULE | Refills: 2 | Status: SHIPPED | OUTPATIENT
Start: 2022-05-19 | End: 2022-08-22 | Stop reason: SDUPTHER

## 2022-05-19 NOTE — TELEPHONE ENCOUNTER
Pt contacted Call Center requested refill of their medication  Medication Name: Pregablin       Dosage of Med: 150 mg      Frequency of Med: Take 1 capsule (150 mg total) by mouth 2 (two) times a day      Remaining Medication: 8      Pharmacy and Location: [facility] pharmacy on file         Pt  Preferred Callback Phone Number: [phone]       Thank you

## 2022-06-16 NOTE — PRE-PROCEDURE INSTRUCTIONS
Pre-Surgery Instructions:   Medication Instructions    ARIPiprazole (ABILIFY) 5 mg tablet Take day of surgery   asenapine (SAPHRIS) 5 mg SL tablet Take day of surgery   baclofen 10 mg tablet Take day of surgery   brompheniramine-pseudoephedrine-DM 30-2-10 MG/5ML syrup Uses PRN- OK to take day of surgery    cycloSPORINE (RESTASIS) 0 05 % ophthalmic emulsion Take day of surgery   dicyclomine (BENTYL) 20 mg tablet Take day of surgery   flecainide (TAMBOCOR) 50 mg tablet Hold day of surgery   hydrOXYzine HCL (ATARAX) 25 mg tablet Uses PRN- OK to take day of surgery    hyoscyamine (LEVSIN/SL) 0 125 mg SL tablet Uses PRN- OK to take day of surgery    levothyroxine 100 mcg tablet Take day of surgery   levothyroxine 137 mcg tablet Take day of surgery   metoprolol succinate (TOPROL-XL) 25 mg 24 hr tablet Take day of surgery   oxyCODONE-acetaminophen (PERCOCET) 7 5-325 MG per tablet Uses PRN- OK to take day of surgery    pantoprazole (PROTONIX) 40 mg tablet Take day of surgery   pregabalin (LYRICA) 150 mg capsule Take day of surgery   topiramate (TOPAMAX) 100 mg tablet Take day of surgery   venlafaxine (EFFEXOR-XR) 150 mg 24 hr capsule Take day of surgery   venlafaxine (EFFEXOR-XR) 37 5 mg 24 hr capsule Take day of surgery      ZOLMitriptan (ZOMIG) 5 MG tablet Take night before surgery    zolpidem (AMBIEN CR) 12 5 MG CR tablet Take night before surgery

## 2022-07-06 ENCOUNTER — HOSPITAL ENCOUNTER (OUTPATIENT)
Dept: RADIOLOGY | Facility: HOSPITAL | Age: 47
Discharge: HOME | End: 2022-07-06
Attending: PHYSICAL MEDICINE & REHABILITATION
Payer: MEDICARE

## 2022-07-06 ENCOUNTER — ANESTHESIA EVENT (OUTPATIENT)
Dept: RADIOLOGY | Facility: HOSPITAL | Age: 47
End: 2022-07-06

## 2022-07-06 ENCOUNTER — ANESTHESIA (OUTPATIENT)
Dept: RADIOLOGY | Facility: HOSPITAL | Age: 47
End: 2022-07-06

## 2022-07-06 VITALS
RESPIRATION RATE: 16 BRPM | TEMPERATURE: 97.2 F | HEART RATE: 60 BPM | HEIGHT: 66 IN | BODY MASS INDEX: 28.12 KG/M2 | DIASTOLIC BLOOD PRESSURE: 90 MMHG | WEIGHT: 175 LBS | SYSTOLIC BLOOD PRESSURE: 133 MMHG | OXYGEN SATURATION: 98 %

## 2022-07-06 DIAGNOSIS — M48.062 SPINAL STENOSIS OF LUMBAR REGION WITH NEUROGENIC CLAUDICATION: ICD-10-CM

## 2022-07-06 DIAGNOSIS — F31.9 BIPOLAR 1 DISORDER (HCC): ICD-10-CM

## 2022-07-06 DIAGNOSIS — M51.16 LUMBAR DISC DISEASE WITH RADICULOPATHY: ICD-10-CM

## 2022-07-06 DIAGNOSIS — G89.4 CHRONIC PAIN SYNDROME: ICD-10-CM

## 2022-07-06 DIAGNOSIS — Z79.891 LONG-TERM CURRENT USE OF OPIATE ANALGESIC: ICD-10-CM

## 2022-07-06 PROCEDURE — G1004 CDSM NDSC: HCPCS

## 2022-07-06 PROCEDURE — 72148 MRI LUMBAR SPINE W/O DYE: CPT

## 2022-07-06 RX ORDER — GLYCOPYRROLATE 0.2 MG/ML
INJECTION INTRAMUSCULAR; INTRAVENOUS AS NEEDED
Status: DISCONTINUED | OUTPATIENT
Start: 2022-07-06 | End: 2022-07-06

## 2022-07-06 RX ORDER — ONDANSETRON 2 MG/ML
INJECTION INTRAMUSCULAR; INTRAVENOUS AS NEEDED
Status: DISCONTINUED | OUTPATIENT
Start: 2022-07-06 | End: 2022-07-06

## 2022-07-06 RX ORDER — PROPOFOL 10 MG/ML
INJECTION, EMULSION INTRAVENOUS AS NEEDED
Status: DISCONTINUED | OUTPATIENT
Start: 2022-07-06 | End: 2022-07-06

## 2022-07-06 RX ORDER — SODIUM CHLORIDE, SODIUM LACTATE, POTASSIUM CHLORIDE, CALCIUM CHLORIDE 600; 310; 30; 20 MG/100ML; MG/100ML; MG/100ML; MG/100ML
INJECTION, SOLUTION INTRAVENOUS CONTINUOUS PRN
Status: DISCONTINUED | OUTPATIENT
Start: 2022-07-06 | End: 2022-07-06

## 2022-07-06 RX ORDER — LIDOCAINE HYDROCHLORIDE 10 MG/ML
INJECTION, SOLUTION EPIDURAL; INFILTRATION; INTRACAUDAL; PERINEURAL AS NEEDED
Status: DISCONTINUED | OUTPATIENT
Start: 2022-07-06 | End: 2022-07-06

## 2022-07-06 RX ORDER — SODIUM CHLORIDE, SODIUM LACTATE, POTASSIUM CHLORIDE, CALCIUM CHLORIDE 600; 310; 30; 20 MG/100ML; MG/100ML; MG/100ML; MG/100ML
125 INJECTION, SOLUTION INTRAVENOUS CONTINUOUS
Status: DISCONTINUED | OUTPATIENT
Start: 2022-07-06 | End: 2022-07-07 | Stop reason: HOSPADM

## 2022-07-06 RX ADMIN — GLYCOPYRROLATE 0.2 MG: 0.2 INJECTION, SOLUTION INTRAMUSCULAR; INTRAVENOUS at 12:56

## 2022-07-06 RX ADMIN — SODIUM CHLORIDE, SODIUM LACTATE, POTASSIUM CHLORIDE, AND CALCIUM CHLORIDE: .6; .31; .03; .02 INJECTION, SOLUTION INTRAVENOUS at 12:53

## 2022-07-06 RX ADMIN — PROPOFOL 200 MG: 10 INJECTION, EMULSION INTRAVENOUS at 12:56

## 2022-07-06 RX ADMIN — ONDANSETRON 4 MG: 2 INJECTION INTRAMUSCULAR; INTRAVENOUS at 12:56

## 2022-07-06 RX ADMIN — LIDOCAINE HYDROCHLORIDE 50 MG: 10 INJECTION, SOLUTION EPIDURAL; INFILTRATION; INTRACAUDAL; PERINEURAL at 12:56

## 2022-07-06 NOTE — ANESTHESIA POSTPROCEDURE EVALUATION
Post-Op Assessment Note    CV Status:  Stable  Pain Score: 0    Pain management: adequate     Mental Status:  Alert and awake   Hydration Status:  Euvolemic   PONV Controlled:  Controlled   Airway Patency:  Patent      Post Op Vitals Reviewed: Yes      Staff: CRNA         No complications documented      /66 (07/06/22 1330)    Temp (!) 96 8 °F (36 °C) (07/06/22 1330)    Pulse 70 (07/06/22 1330)   Resp (!) 11 (07/06/22 1330)    SpO2 97 % (07/06/22 1330)

## 2022-07-06 NOTE — ANESTHESIA PREPROCEDURE EVALUATION
Procedure:  MRI LUMBAR SPINE WO CONTRAST    Relevant Problems   NEURO/PSYCH   (+) Anxiety      Endocrine   (+) Thyroid disease      Other   (+) Bipolar 1 disorder (HCC)        Physical Exam    Airway      TM Distance: >3 FB  Neck ROM: full     Dental   No notable dental hx     Cardiovascular      Pulmonary      Other Findings        Anesthesia Plan  ASA Score- 2     Anesthesia Type- general with ASA Monitors  Additional Monitors:   Airway Plan: LMA  Plan Factors-    Chart reviewed  Patient summary reviewed  Induction- intravenous  Postoperative Plan-   Planned trial extubation    Informed Consent- Anesthetic plan and risks discussed with patient  I personally reviewed this patient with the CRNA  Discussed and agreed on the Anesthesia Plan with the CRNA  Irving Castellano

## 2022-07-11 ENCOUNTER — TELEPHONE (OUTPATIENT)
Dept: PAIN MEDICINE | Facility: CLINIC | Age: 47
End: 2022-07-11

## 2022-07-11 DIAGNOSIS — M48.061 FORAMINAL STENOSIS OF LUMBAR REGION: ICD-10-CM

## 2022-07-11 DIAGNOSIS — G89.4 CHRONIC PAIN SYNDROME: Primary | ICD-10-CM

## 2022-07-11 NOTE — TELEPHONE ENCOUNTER
MRI lumbar spine demonstrating degenerative disc disease at L4-L5 and L5-S1 as well as foraminal narrowing at L5-S1   In order has been placed for an L5-S1 bilateral transforaminal epidural steroid injection  Please schedule   She is already on a multitude of medications including opiates, membrane stabilizing agents, muscle relaxers and nonsteroidals with minimal relief   At this time we will focus on interventional approaches to manage her pain   Thank you

## 2022-07-11 NOTE — TELEPHONE ENCOUNTER
Leonardo Nunn called I scheduled her to see Ellie Amalia on 7/21  PT mentioned she is in pain she has been taking Advil 3-4 pills every 8 hours but it hasn't been working  She is also taking Alive back + Muscle 200mg  twice a day, alive is only working for about 3 hours  PT  Wants to know if she can get anything else for the pain

## 2022-07-21 ENCOUNTER — OFFICE VISIT (OUTPATIENT)
Dept: PAIN MEDICINE | Facility: CLINIC | Age: 47
End: 2022-07-21
Payer: MEDICARE

## 2022-07-21 VITALS
SYSTOLIC BLOOD PRESSURE: 132 MMHG | DIASTOLIC BLOOD PRESSURE: 86 MMHG | BODY MASS INDEX: 28.12 KG/M2 | WEIGHT: 175 LBS | HEIGHT: 66 IN

## 2022-07-21 DIAGNOSIS — M54.16 LUMBAR RADICULOPATHY: ICD-10-CM

## 2022-07-21 DIAGNOSIS — G89.4 CHRONIC PAIN SYNDROME: Primary | ICD-10-CM

## 2022-07-21 PROCEDURE — 99214 OFFICE O/P EST MOD 30 MIN: CPT | Performed by: NURSE PRACTITIONER

## 2022-07-21 RX ORDER — TIZANIDINE 4 MG/1
4 TABLET ORAL EVERY 8 HOURS PRN
Qty: 90 TABLET | Refills: 1 | Status: SHIPPED | OUTPATIENT
Start: 2022-07-21 | End: 2022-08-14

## 2022-07-21 NOTE — PROGRESS NOTES
Pain Medicine Follow-Up Note    Assessment:  1  Chronic pain syndrome    2  Lumbar radiculopathy        Plan:  Patient is being seen for follow-up visit  She was initially seen here for consultation on 03/25/2022 which time an MRI of her lumbar spine was ordered  MRI reveals disc degeneration at L4-5 and L5-S1 without significant canal stenosis  There is mild bilateral foraminal narrowing at L5-S1  MRI results were reviewed with patient during today's visit  Patient is scheduled for bilateral L5-S1 transforaminal epidural steroid injection  Since the patient is having myofascial pain and/or spasms, I advised the patient that starting on a muscle relaxer could help decrease some of the myofascial pain and/or spasms  I advised patient that they should not drive or operate heavy machinery while on this medication as it could cause lethargy  I advised the patient to call our office if they experience any side effects  The patient verbalized understanding  Start tizanidine 4 mg which he can take every 8 hours as needed for spasms  Continue Lyrica 150 mg twice daily  She did not require refill this medication during today's visit  Follow-up 1 month after the injection        No orders of the defined types were placed in this encounter  New Medications Ordered This Visit   Medications    tiZANidine (ZANAFLEX) 4 mg tablet     Sig: Take 1 tablet (4 mg total) by mouth every 8 (eight) hours as needed for muscle spasms     Dispense:  90 tablet     Refill:  1       My impressions and treatment recommendations were discussed in detail with the patient who verbalized understanding and had no further questions  History of Present Illness:    Toya Chandra is a 50 y o  female who presents to [facility] for interval re-evaluation of the above stated pain complaints  The patient has a past medical and chronic pain history as outlined in the assessment section   She was last seen on 03/25/2022  Patient is complaining of low back pain and intermittent pain that radiates down the posterior right thigh  She denies numbness tingling or weakness of her lower extremities  Also, occasional mid and upper back pain  Current pain level 7/10  Quality pain is described as burning, dull, aching, sharp, throbbing, shooting peer      Other than as stated above, the patient denies any interval changes in medications, medical condition, mental condition, symptoms, or allergies since the last office visit  Review of Systems:    Review of Systems   HENT: Negative  Eyes: Negative  Respiratory: Negative  Cardiovascular: Positive for palpitations  Gastrointestinal: Positive for constipation and diarrhea  Genitourinary: Positive for frequency and urgency  Musculoskeletal: Positive for back pain  Skin: Negative  Allergic/Immunologic: Positive for environmental allergies  Neurological: Negative  Psychiatric/Behavioral: Positive for dysphoric mood and sleep disturbance  The patient is nervous/anxious  Ptsd         Patient Active Problem List   Diagnosis    Bipolar 1 disorder ([facility] )    Thyroid disease    Anxiety    Chronic pain syndrome    Lumbar radiculopathy       Past Medical History:   Diagnosis Date    Anxiety     Bipolar 1 disorder ([facility] )     Depression     Disease of thyroid gland     Fibromyalgia, primary     Inflammatory bowel disease     Thyroid disease        Past Surgical History:   Procedure Laterality Date    APPENDECTOMY      KNEE SURGERY      TOTAL THYROIDECTOMY         History reviewed  No pertinent family history  Social History     Occupational History    Not on file   Tobacco Use    Smoking status: Current Every Day Smoker     Packs/day: 0 50     Types: Cigarettes    Smokeless tobacco: Never Used   Vaping Use    Vaping Use: Never used   Substance and Sexual Activity    Alcohol use: Yes     Comment: socially     Drug use:  No  Sexual activity: Not on file         Current Outpatient Medications:     tiZANidine (ZANAFLEX) 4 mg tablet, Take 1 tablet (4 mg total) by mouth every 8 (eight) hours as needed for muscle spasms, Disp: 90 tablet, Rfl: 1    albuterol (VENTOLIN HFA) 90 mcg/act inhaler, Inhale 2 puffs every 6 (six) hours as needed for wheezing (Patient not taking: No sig reported), Disp: 18 g, Rfl: 0    ARIPiprazole (ABILIFY) 5 mg tablet, Take 5 mg by mouth daily, Disp: , Rfl:     asenapine (SAPHRIS) 5 mg SL tablet, Place 5 mg under the tongue daily at bedtime  , Disp: , Rfl:     benzonatate (TESSALON) 200 MG capsule, Take 1 capsule (200 mg total) by mouth 3 (three) times a day as needed for cough (Patient not taking: No sig reported), Disp: 20 capsule, Rfl: 0    brompheniramine-pseudoephedrine-DM 30-2-10 MG/5ML syrup, Take 10 mL by mouth 4 (four) times a day as needed for congestion or cough, Disp: 120 mL, Rfl: 0    cycloSPORINE (RESTASIS) 0 05 % ophthalmic emulsion, Administer 1 drop to both eyes 2 (two) times a day , Disp: , Rfl:     dicyclomine (BENTYL) 20 mg tablet, Take 20 mg by mouth 3 (three) times a day, Disp: , Rfl:     flecainide (TAMBOCOR) 50 mg tablet, 150 mg , Disp: , Rfl:     hydrOXYzine HCL (ATARAX) 25 mg tablet, Take 25-50 mg by mouth every 6 (six) hours as needed, Disp: , Rfl:     hyoscyamine (LEVSIN/SL) 0 125 mg SL tablet, Take 1 tablet (0 125 mg total) by mouth every 6 (six) hours as needed for cramping, Disp: 60 tablet, Rfl: 3    levothyroxine 100 mcg tablet, Take 112 mcg by mouth daily  , Disp: , Rfl:     levothyroxine 137 mcg tablet, , Disp: , Rfl:     metoprolol succinate (TOPROL-XL) 25 mg 24 hr tablet, Take 25 mg by mouth 2 (two) times a day, Disp: , Rfl:     oxyCODONE-acetaminophen (PERCOCET) 7 5-325 MG per tablet, Take 1 tablet by mouth every 6 (six) hours as needed for moderate pain , Disp: , Rfl:     pantoprazole (PROTONIX) 40 mg tablet, Take 1 tablet by mouth daily, Disp: , Rfl:    pregabalin (LYRICA) 150 mg capsule, Take 1 capsule (150 mg total) by mouth in the morning and 1 capsule (150 mg total) in the evening , Disp: 60 capsule, Rfl: 2    topiramate (TOPAMAX) 100 mg tablet, Take 100 mg by mouth 2 (two) times a day , Disp: , Rfl:     venlafaxine (EFFEXOR-XR) 150 mg 24 hr capsule, Take 150 mg by mouth daily  , Disp: , Rfl:     venlafaxine (EFFEXOR-XR) 37 5 mg 24 hr capsule, Take 37 5 mg by mouth daily  , Disp: , Rfl:     ZOLMitriptan (ZOMIG) 5 MG tablet, Take 5 mg by mouth once as needed for migraine May repeat in 2 hours if unresolved  Do not exceed 10 mg in 24 hours  , Disp: , Rfl:     zolpidem (AMBIEN CR) 12 5 MG CR tablet, Take 10 mg by mouth daily at bedtime as needed for sleep , Disp: , Rfl:     Allergies   Allergen Reactions    Pollen Extract        Physical Exam:    /86   Ht 5' 6" (1 676 m)   Wt 79 4 kg (175 lb)   BMI 28 25 kg/m²     Constitutional:normal, well developed, well nourished, alert, in no distress and non-toxic and no overt pain behavior  Eyes:anicteric  HEENT:grossly intact  Neck:supple, symmetric, trachea midline and no masses   Pulmonary:even and unlabored  Cardiovascular:No edema or pitting edema present  Skin:Normal without rashes or lesions and well hydrated  Psychiatric:Mood and affect appropriate  Neurologic:Cranial Nerves II-XII grossly intact  Musculoskeletal:normal      Imaging  No orders to display         No orders of the defined types were placed in this encounter

## 2022-08-04 ENCOUNTER — HOSPITAL ENCOUNTER (OUTPATIENT)
Dept: RADIOLOGY | Facility: MEDICAL CENTER | Age: 47
Discharge: HOME | End: 2022-08-04
Admitting: PHYSICAL MEDICINE & REHABILITATION
Payer: MEDICARE

## 2022-08-04 VITALS
SYSTOLIC BLOOD PRESSURE: 105 MMHG | HEART RATE: 55 BPM | RESPIRATION RATE: 18 BRPM | OXYGEN SATURATION: 99 % | TEMPERATURE: 97.2 F | DIASTOLIC BLOOD PRESSURE: 72 MMHG

## 2022-08-04 DIAGNOSIS — M48.061 FORAMINAL STENOSIS OF LUMBAR REGION: ICD-10-CM

## 2022-08-04 DIAGNOSIS — G89.4 CHRONIC PAIN SYNDROME: ICD-10-CM

## 2022-08-04 PROCEDURE — 64483 NJX AA&/STRD TFRM EPI L/S 1: CPT | Performed by: PHYSICAL MEDICINE & REHABILITATION

## 2022-08-04 RX ORDER — BUPIVACAINE HCL/PF 2.5 MG/ML
10 VIAL (ML) INJECTION ONCE
Status: COMPLETED | OUTPATIENT
Start: 2022-08-04 | End: 2022-08-04

## 2022-08-04 RX ORDER — LIDOCAINE HYDROCHLORIDE 10 MG/ML
5 INJECTION, SOLUTION EPIDURAL; INFILTRATION; INTRACAUDAL; PERINEURAL ONCE
Status: COMPLETED | OUTPATIENT
Start: 2022-08-04 | End: 2022-08-04

## 2022-08-04 RX ORDER — PAPAVERINE HCL 150 MG
20 CAPSULE, EXTENDED RELEASE ORAL ONCE
Status: COMPLETED | OUTPATIENT
Start: 2022-08-04 | End: 2022-08-04

## 2022-08-04 RX ADMIN — Medication 1 ML: at 14:30

## 2022-08-04 RX ADMIN — LIDOCAINE HYDROCHLORIDE 3 ML: 10 INJECTION, SOLUTION EPIDURAL; INFILTRATION; INTRACAUDAL; PERINEURAL at 14:26

## 2022-08-04 RX ADMIN — IOHEXOL 2 ML: 300 INJECTION, SOLUTION INTRAVENOUS at 14:29

## 2022-08-04 RX ADMIN — DEXAMETHASONE SODIUM PHOSPHATE 20 MG: 10 INJECTION, SOLUTION INTRAMUSCULAR; INTRAVENOUS at 14:30

## 2022-08-04 NOTE — DISCHARGE INSTR - LAB
Epidural Steroid Injection   WHAT YOU NEED TO KNOW:   An epidural steroid injection (DAVID) is a procedure to inject steroid medicine into the epidural space  The epidural space is between your spinal cord and vertebrae  Steroids reduce inflammation and fluid buildup in your spine that may be causing pain  You may be given pain medicine along with the steroids  ACTIVITY  Do not drive or operate machinery today  No strenuous activity today - bending, lifting, etc   You may resume normal activites starting tomorrow - start slowly and as tolerated  You may shower today, but no tub baths or hot tubs  You may have numbness for several hours from the local anesthetic  Please use caution and common sense, especially with weight-bearing activities  CARE OF THE INJECTION SITE  If you have soreness or pain, apply ice to the area today (20 minutes on/20 minutes off)  Starting tomorrow, you may use warm, moist heat or ice if needed  You may have an increase or change in your discomfort for 36-48 hours after your treatment  Apply ice and continue with any pain medication you have been prescribed  Notify the [facility] if you have any of the following: redness, drainage, swelling, headache, stiff neck or fever above 100°F     SPECIAL INSTRUCTIONS  Our office will contact you in approximately 7 days for a progress report  MEDICATIONS  Continue to take all routine medications  Our office may have instructed you to hold some medications  As no general anesthesia was used in today's procedure, you should not experience any side effects related to anesthesia  If you are diabetic, the steroids used in today's injection may temporarily increase your blood sugar levels after the first few days after your injection  Please keep a close eye on your sugars and alert the doctor who manages your diabetes if your sugars are significantly high from your baseline or you are symptomatic       If you have a problem specifically related to your procedure, please call our office at [phone]  Problems not related to your procedure should be directed to your primary care physician

## 2022-08-04 NOTE — H&P
History of Present Illness: The patient is a 50 y o  female who presents with complaints of low back pain    Patient Active Problem List   Diagnosis    Bipolar 1 disorder ([facility] )    Thyroid disease    Anxiety    Chronic pain syndrome    Lumbar radiculopathy       Past Medical History:   Diagnosis Date    Anxiety     [facility]ipolar 1 disorder ([facility] )     Depression     Disease of thyroid gland     Fibromyalgia, primary     Inflammatory bowel disease     Thyroid disease        Past Surgical History:   Procedure Laterality Date    APPENDECTOMY      KNEE SURGERY      TOTAL THYROIDECTOMY           Current Outpatient Medications:     albuterol (VENTOLIN HFA) 90 mcg/act inhaler, Inhale 2 puffs every 6 (six) hours as needed for wheezing (Patient not taking: No sig reported), Disp: 18 g, Rfl: 0    ARIPiprazole (ABILIFY) 5 mg tablet, Take 5 mg by mouth daily, Disp: , Rfl:     asenapine (SAPHRIS) 5 mg SL tablet, Place 5 mg under the tongue daily at bedtime  , Disp: , Rfl:     benzonatate (TESSALON) 200 MG capsule, Take 1 capsule (200 mg total) by mouth 3 (three) times a day as needed for cough (Patient not taking: No sig reported), Disp: 20 capsule, Rfl: 0    brompheniramine-pseudoephedrine-DM 30-2-10 MG/5ML syrup, Take 10 mL by mouth 4 (four) times a day as needed for congestion or cough, Disp: 120 mL, Rfl: 0    cycloSPORINE (RESTASIS) 0 05 % ophthalmic emulsion, Administer 1 drop to both eyes 2 (two) times a day , Disp: , Rfl:     dicyclomine (BENTYL) 20 mg tablet, Take 20 mg by mouth 3 (three) times a day, Disp: , Rfl:     flecainide (TAMBOCOR) 50 mg tablet, 150 mg , Disp: , Rfl:     hydrOXYzine HCL (ATARAX) 25 mg tablet, Take 25-50 mg by mouth every 6 (six) hours as needed, Disp: , Rfl:     hyoscyamine (LEVSIN/SL) 0 125 mg SL tablet, Take 1 tablet (0 125 mg total) by mouth every 6 (six) hours as needed for cramping, Disp: 60 tablet, Rfl: 3    levothyroxine 100 mcg tablet, Take 112 mcg by mouth daily  , Disp: , Rfl:     levothyroxine 137 mcg tablet, , Disp: , Rfl:     metoprolol succinate (TOPROL-XL) 25 mg 24 hr tablet, Take 25 mg by mouth 2 (two) times a day, Disp: , Rfl:     oxyCODONE-acetaminophen (PERCOCET) 7 5-325 MG per tablet, Take 1 tablet by mouth every 6 (six) hours as needed for moderate pain , Disp: , Rfl:     pantoprazole (PROTONIX) 40 mg tablet, Take 1 tablet by mouth daily, Disp: , Rfl:     pregabalin (LYRICA) 150 mg capsule, Take 1 capsule (150 mg total) by mouth in the morning and 1 capsule (150 mg total) in the evening , Disp: 60 capsule, Rfl: 2    tiZANidine (ZANAFLEX) 4 mg tablet, Take 1 tablet (4 mg total) by mouth every 8 (eight) hours as needed for muscle spasms, Disp: 90 tablet, Rfl: 1    topiramate (TOPAMAX) 100 mg tablet, Take 100 mg by mouth 2 (two) times a day , Disp: , Rfl:     venlafaxine (EFFEXOR-XR) 150 mg 24 hr capsule, Take 150 mg by mouth daily  , Disp: , Rfl:     venlafaxine (EFFEXOR-XR) 37 5 mg 24 hr capsule, Take 37 5 mg by mouth daily  , Disp: , Rfl:     ZOLMitriptan (ZOMIG) 5 MG tablet, Take 5 mg by mouth once as needed for migraine May repeat in 2 hours if unresolved  Do not exceed 10 mg in 24 hours  , Disp: , Rfl:     zolpidem (AMBIEN CR) 12 5 MG CR tablet, Take 10 mg by mouth daily at bedtime as needed for sleep , Disp: , Rfl:     Current Facility-Administered Medications:     bupivacaine (PF) (MARCAINE) 0 25 % injection 10 mL, 10 mL, Epidural, Once, Alma Ohms, DO    dexamethasone (PF) (DECADRON) injection 20 mg, 20 mg, Epidural, Once, Alma Ohms, DO    iohexol (OMNIPAQUE) 300 mg/mL injection 50 mL, 50 mL, Epidural, Once, Alma Ohms, DO    lidocaine (PF) (XYLOCAINE-MPF) 1 % injection 5 mL, 5 mL, Infiltration, Once, Alma Ohms, DO    Allergies   Allergen Reactions    Pollen Extract        Physical Exam: There were no vitals filed for this visit    General: Awake, Alert, Oriented x 3, Mood and affect appropriate  Respiratory: Respirations even and unlabored  Cardiovascular: Peripheral pulses intact; no edema  Musculoskeletal Exam:  Tenderness to palpation bilateral lumbar paraspinals  ASA Score: 2         Assessment:   1  Foraminal stenosis of lumbar region    2   Chronic pain syndrome        Plan: Bilateral L5-S1 TFESI

## 2022-08-11 ENCOUNTER — TELEPHONE (OUTPATIENT)
Dept: PAIN MEDICINE | Facility: CLINIC | Age: 47
End: 2022-08-11

## 2022-08-13 DIAGNOSIS — G89.4 CHRONIC PAIN SYNDROME: ICD-10-CM

## 2022-08-14 RX ORDER — TIZANIDINE 4 MG/1
4 TABLET ORAL EVERY 8 HOURS PRN
Qty: 270 TABLET | Refills: 1 | Status: SHIPPED | OUTPATIENT
Start: 2022-08-14

## 2022-08-15 NOTE — TELEPHONE ENCOUNTER
2nd attempt  Left v/m for patient to return call  Please ask patient % of improvement and pain scale

## 2022-08-15 NOTE — TELEPHONE ENCOUNTER
Patient states 35-40% of improvement & pain level is 4/10-     Pt stated pain in her upper back going into her neck and right arm       thank you

## 2022-08-22 ENCOUNTER — TELEPHONE (OUTPATIENT)
Dept: PAIN MEDICINE | Facility: MEDICAL CENTER | Age: 47
End: 2022-08-22

## 2022-08-22 DIAGNOSIS — M51.16 LUMBAR DISC DISEASE WITH RADICULOPATHY: ICD-10-CM

## 2022-08-22 DIAGNOSIS — Z79.891 LONG-TERM CURRENT USE OF OPIATE ANALGESIC: ICD-10-CM

## 2022-08-22 DIAGNOSIS — M48.062 SPINAL STENOSIS OF LUMBAR REGION WITH NEUROGENIC CLAUDICATION: ICD-10-CM

## 2022-08-22 DIAGNOSIS — G89.4 CHRONIC PAIN SYNDROME: ICD-10-CM

## 2022-08-22 RX ORDER — PREGABALIN 150 MG/1
150 CAPSULE ORAL 2 TIMES DAILY
Qty: 60 CAPSULE | Refills: 2 | Status: SHIPPED | OUTPATIENT
Start: 2022-08-22 | End: 2022-10-05 | Stop reason: SDUPTHER

## 2022-08-22 NOTE — TELEPHONE ENCOUNTER
Pt contacted Call Center requested refill of their medication          Medication Name:    lyrica   Dosage of Med:  150 mg     Frequency of Med:  1 tab in the morning and 1 tab in the evening     Remaining Medication:  NONE     Pharmacy and Location:    [facility]

## 2022-08-22 NOTE — TELEPHONE ENCOUNTER
Dr Grupo Harris is out of the office today-       S/w pt and pt is requesting a refill of her Lyrica 150 mg bid  Pt states medication is helping and denies side effects  Pt has a f/u appt on 9/14  Please advise- Is it possible for pt to have a refill to get her to her appt on 9/14?

## 2022-08-24 NOTE — TELEPHONE ENCOUNTER
Pt was told that the Lyrica 150 is inactive  Pt was on Lyrica 100 mg but it was increased to 150 mg  The pharmacy said they do not have a refill for this  Pt only has 1 pill left  Pt is calling [facility] to see if it is there  Pt # [phone]  Pt goes to :    [facility]   [address] [address] [facility]  [phone]    Please change in computer   No longer goes to [facility]

## 2022-08-24 NOTE — TELEPHONE ENCOUNTER
Left detailed message as per JERILYN advising pt that Lyrica refills were sent to [facility]  Rn removed [facility] as preferred pharmacy and made [facility] pts pref pharmacy on file  Pt instructed to CB# provided for any questions

## 2022-10-05 ENCOUNTER — OFFICE VISIT (OUTPATIENT)
Dept: PAIN MEDICINE | Facility: CLINIC | Age: 47
End: 2022-10-05
Payer: MEDICARE

## 2022-10-05 VITALS
OXYGEN SATURATION: 96 % | HEIGHT: 66 IN | DIASTOLIC BLOOD PRESSURE: 74 MMHG | HEART RATE: 72 BPM | WEIGHT: 185 LBS | BODY MASS INDEX: 29.73 KG/M2 | SYSTOLIC BLOOD PRESSURE: 116 MMHG

## 2022-10-05 DIAGNOSIS — M48.062 SPINAL STENOSIS OF LUMBAR REGION WITH NEUROGENIC CLAUDICATION: ICD-10-CM

## 2022-10-05 DIAGNOSIS — M51.16 LUMBAR DISC DISEASE WITH RADICULOPATHY: ICD-10-CM

## 2022-10-05 DIAGNOSIS — G56.03 CARPAL TUNNEL SYNDROME, BILATERAL: ICD-10-CM

## 2022-10-05 DIAGNOSIS — Z79.891 LONG-TERM CURRENT USE OF OPIATE ANALGESIC: ICD-10-CM

## 2022-10-05 DIAGNOSIS — M25.552 LEFT HIP PAIN: ICD-10-CM

## 2022-10-05 DIAGNOSIS — M48.061 FORAMINAL STENOSIS OF LUMBAR REGION: ICD-10-CM

## 2022-10-05 DIAGNOSIS — M54.16 LUMBAR RADICULOPATHY: Primary | ICD-10-CM

## 2022-10-05 DIAGNOSIS — G89.4 CHRONIC PAIN SYNDROME: ICD-10-CM

## 2022-10-05 PROCEDURE — 99214 OFFICE O/P EST MOD 30 MIN: CPT | Performed by: PHYSICIAN ASSISTANT

## 2022-10-05 RX ORDER — CLONAZEPAM 0.5 MG/1
TABLET ORAL
COMMUNITY
Start: 2022-09-08

## 2022-10-05 RX ORDER — LITHIUM CARBONATE 300 MG
TABLET ORAL
COMMUNITY
Start: 2022-07-05

## 2022-10-05 RX ORDER — PREGABALIN 150 MG/1
150 CAPSULE ORAL 2 TIMES DAILY
Qty: 60 CAPSULE | Refills: 2 | Status: SHIPPED | OUTPATIENT
Start: 2022-10-05

## 2022-10-05 RX ORDER — BUSPIRONE HYDROCHLORIDE 15 MG/1
TABLET ORAL
COMMUNITY
Start: 2022-07-05

## 2022-10-05 RX ORDER — FLECAINIDE ACETATE 100 MG/1
TABLET ORAL
COMMUNITY
Start: 2022-09-19

## 2022-10-05 NOTE — PATIENT INSTRUCTIONS
Tizanidine (By mouth)   Tizanidine ([id number])  Treats muscle spasticity  Brand Name(s): Zanaflex, Zanaflex Capsule   There may be other brand names for this medicine  When This Medicine Should Not Be Used: This medicine is not right for everyone  Do not use if you had an allergic reaction to tizanidine  How to Use This Medicine:   Capsule, Tablet  Take your medicine as directed  Your dose may need to be changed several times to find what works best for you  You may take this medicine with or without food, but always take it the same way every time  Tizanidine works differently depending on whether you take it on an empty stomach or a full stomach  Talk to your doctor if you have any questions about this  Missed dose: Take a dose as soon as you remember  If it is almost time for your next dose, wait until then and take a regular dose  Do not take extra medicine to make up for a missed dose  Store the medicine in a closed container at room temperature, away from heat, moisture, and direct light  Drugs and Foods to Avoid:   Ask your doctor or pharmacist before using any other medicine, including over-the-counter medicines, vitamins, and herbal products  Do not use this medicine together with ciprofloxacin or fluvoxamine  Some foods and medicines can affect how tizanidine works  Tell your doctor if you are using any of the following:  Acyclovir, baclofen, cimetidine, famotidine, ticlopidine, verapamil, zileuton  Birth control pills, blood pressure medicine, medicine for heart rhythm problems (such as amiodarone, mexiletine, propafenone), or medicine to treat an infection (such as levofloxacin, moxifloxacin)  Do not drink alcohol while you are using this medicine  Tell your doctor if you use anything else that makes you sleepy  Some examples are allergy medicine, narcotic pain medicine, and alcohol    Warnings While Using This Medicine:   Tell your doctor if you are pregnant or breastfeeding, or if you have kidney disease or liver disease  This medicine may cause the following problems:  Low blood pressure  Liver damage  This medicine may make you dizzy or drowsy  Do not drive or do anything else that could be dangerous until you know how this medicine affects you  Stand or sit up slowly if you are dizzy  Do not stop using this medicine suddenly  Your doctor will need to slowly decrease your dose before you stop it completely  Your doctor will do lab tests at regular visits to check on the effects of this medicine  Keep all appointments  Keep all medicine out of the reach of children  Never share your medicine with anyone  Possible Side Effects While Using This Medicine:   Call your doctor right away if you notice any of these side effects: Allergic reaction: Itching or hives, swelling in your face or hands, swelling or tingling in your mouth or throat, chest tightness, trouble breathing  Dark urine or pale stools, nausea, vomiting, loss of appetite, stomach pain, yellow skin or eyes  Lightheadedness, dizziness, or fainting  Seeing or hearing things that are not really there  Slow heartbeat  If you notice these less serious side effects, talk with your doctor:   Dry mouth  Drowsiness or sleepiness  Weakness  If you notice other side effects that you think are caused by this medicine, tell your doctor  Call your doctor for medical advice about side effects  You may report side effects to FDA at 0-999-FDA-6699    © Copyright [employer] 2022 Information is for End User's use only and may not be sold, redistributed or otherwise used for commercial purposes  The above information is an  only  It is not intended as medical advice for individual conditions or treatments  Talk to your doctor, nurse or pharmacist before following any medical regimen to see if it is safe and effective for you

## 2022-10-05 NOTE — PROGRESS NOTES
Assessment:  1  Lumbar radiculopathy    2  Foraminal stenosis of lumbar region    3  Chronic pain syndrome    4  Left hip pain    5  Carpal tunnel syndrome, bilateral    6  Spinal stenosis of lumbar region with neurogenic claudication    7  Lumbar disc disease with radiculopathy    8  Long-term current use of opiate analgesic        Plan:  While the patient was in the office today, I did have a thorough conversation regarding their chronic pain syndrome, medication management, and treatment plan options  The patient reports greater than 50% reduction of pain with the transforaminal epidural steroid injections on August 4, 2022 at the L5-S1 region bilaterally  She has minimal lower extremity radicular symptoms at this point  She is aware that she can repeat this injection if needed in the future  She has complaints of left hip pain radiating to left groin with intermittent tightness and spasm into the thigh  This has been issue on and off for years however she does not want to proceed with injection therapy in this region  The patient is exhibiting symptoms can possibly carpal tunnel syndrome  We did discuss the possibility of obtain EMG of the upper extremities however she declines at this time  I did recommend a trial of carpal tunnel wrist braces to wear it nightly for a month to see if this alleviates some of the symptoms  I recommended that she restart the tizanidine 4 mg q h s  and 2 mg b i d  as needed for pain/spasm  Follow-up in 2 months or sooner if needed if the pain  My impressions and treatment recommendations were discussed in detail with the patient who verbalized understanding and had no further questions  Discharge instructions were provided  I personally saw and examined the patient and I agree with the above discussed plan of care  No orders of the defined types were placed in this encounter      New Medications Ordered This Visit   Medications    flecainide (TAMBOCOR) 100 mg tablet    lithium 300 MG tablet    busPIRone (BUSPAR) 15 mg tablet    clonazePAM (KlonoPIN) 0 5 mg tablet     Sig: TAKE 0 5-1 TAB BY MOUTH TWICE A DAY AS NEEDED FOR ANXIETY    pregabalin (LYRICA) 150 mg capsule     Sig: Take 1 capsule (150 mg total) by mouth 2 (two) times a day     Dispense:  60 capsule     Refill:  2       History of Present Illness:  Michael Britton is a 50 y o  female who presents for a follow up office visit in regards to Back Pain  The patients current symptoms include complaints of chronic low back pain radiating into the bilateral lower extremities  She underwent transforaminal epidural steroid injection at L5-S1 bilaterally on August 4, 2022 with greater than 60% reduction in pain  She has complaints of left lateral hip pain with radiation into the left groin  She has had this issue for quite some time but does not want any intervention regarding this  She is also complaining of numbness and tingling in bilateral hands more so when she wakes up in the morning  She is doing well on Lyrica 150 mg t i d  and she does have tizanidine home she has not used it recently  I have personally reviewed and/or updated the patient's past medical history, past surgical history, family history, social history, current medications, allergies, and vital signs today  Review of Systems   Respiratory: Negative for shortness of breath  Cardiovascular: Positive for leg swelling  Negative for chest pain  Gastrointestinal: Negative for constipation, diarrhea, nausea and vomiting  Musculoskeletal: Positive for arthralgias, back pain and myalgias  Negative for gait problem and joint swelling  Skin: Negative for rash  Neurological: Negative for dizziness, seizures and weakness  All other systems reviewed and are negative        Patient Active Problem List   Diagnosis    [facility] disorder ([facility]    Thyroid disease    Anxiety    Chronic pain syndrome    Lumbar radiculopathy    Foraminal stenosis of lumbar region       Past Medical History:   Diagnosis Date    Anxiety     Bipolar 1 disorder ([address] )     Depression     Disease of thyroid gland     Fibromyalgia, primary     GERD (gastroesophageal reflux disease) 2015    Inflammatory bowel disease     Thyroid disease        Past Surgical History:   Procedure Laterality Date    APPENDECTOMY      EPIDURAL BLOCK INJECTION  2017?  KNEE SURGERY      ORTHOPEDIC SURGERY  2008    TOTAL THYROIDECTOMY         Family History   Problem Relation Age of Onset   Arabella Courser Cancer Mother     Depression Mother     Diabetes Mother     GI problems Mother     Hypertension Mother     Mental illness Mother    Arabella Courser Migraines Mother     No Known Problems Father        Social History     Occupational History    Not on file   Tobacco Use    Smoking status: Current Every Day Smoker     Packs/day: 0 50     Years: 20 00     Pack years: 10 00     Types: Cigarettes    Smokeless tobacco: Never Used   Vaping Use    Vaping Use: Never used   Substance and Sexual Activity    Alcohol use: Yes     Comment: socially     Drug use: No    Sexual activity: Yes     Partners: Male     Birth control/protection: None       Current Outpatient Medications on File Prior to Visit   Medication Sig    busPIRone (BUSPAR) 15 mg tablet     clonazePAM (KlonoPIN) 0 5 mg tablet TAKE 0 5-1 TAB BY MOUTH TWICE A DAY AS NEEDED FOR ANXIETY    cycloSPORINE (RESTASIS) 0 05 % ophthalmic emulsion Administer 1 drop to both eyes 2 (two) times a day   dicyclomine (BENTYL) 20 mg tablet Take 20 mg by mouth 3 (three) times a day    flecainide (TAMBOCOR) 100 mg tablet     flecainide (TAMBOCOR) 50 mg tablet 150 mg     hydrOXYzine HCL (ATARAX) 25 mg tablet Take 25-50 mg by mouth every 6 (six) hours as needed    levothyroxine 100 mcg tablet Take 112 mcg by mouth daily      lithium 300 MG tablet     pantoprazole (PROTONIX) 40 mg tablet Take 1 tablet by mouth daily    tiZANidine (ZANAFLEX) 4 mg tablet TAKE 1 TABLET (4 MG TOTAL) BY MOUTH EVERY 8 (EIGHT) HOURS AS NEEDED FOR MUSCLE SPASMS    venlafaxine (EFFEXOR-XR) 150 mg 24 hr capsule Take 150 mg by mouth daily   venlafaxine (EFFEXOR-XR) 37 5 mg 24 hr capsule Take 37 5 mg by mouth daily   ZOLMitriptan (ZOMIG) 5 MG tablet Take 5 mg by mouth once as needed for migraine May repeat in 2 hours if unresolved  Do not exceed 10 mg in 24 hours   [DISCONTINUED] pregabalin (LYRICA) 150 mg capsule Take 1 capsule (150 mg total) by mouth 2 (two) times a day    albuterol (VENTOLIN HFA) 90 mcg/act inhaler Inhale 2 puffs every 6 (six) hours as needed for wheezing (Patient not taking: No sig reported)    ARIPiprazole (ABILIFY) 5 mg tablet Take 5 mg by mouth daily    asenapine (SAPHRIS) 5 mg SL tablet Place 5 mg under the tongue daily at bedtime  (Patient not taking: Reported on 10/5/2022)    benzonatate (TESSALON) 200 MG capsule Take 1 capsule (200 mg total) by mouth 3 (three) times a day as needed for cough (Patient not taking: No sig reported)    brompheniramine-pseudoephedrine-DM 30-2-10 MG/5ML syrup Take 10 mL by mouth 4 (four) times a day as needed for congestion or cough    hyoscyamine (LEVSIN/SL) 0 125 mg SL tablet Take 1 tablet (0 125 mg total) by mouth every 6 (six) hours as needed for cramping    levothyroxine 137 mcg tablet  (Patient not taking: Reported on 10/5/2022)    metoprolol succinate (TOPROL-XL) 25 mg 24 hr tablet Take 25 mg by mouth 2 (two) times a day    oxyCODONE-acetaminophen (PERCOCET) 7 5-325 MG per tablet Take 1 tablet by mouth every 6 (six) hours as needed for moderate pain   topiramate (TOPAMAX) 100 mg tablet Take 100 mg by mouth 2 (two) times a day  (Patient not taking: Reported on 10/5/2022)    zolpidem (AMBIEN CR) 12 5 MG CR tablet Take 10 mg by mouth daily at bedtime as needed for sleep  (Patient not taking: Reported on 10/5/2022)     No current facility-administered medications on file prior to visit         Allergies Allergen Reactions    Pollen Extract        Physical Exam:    /74   Pulse 72   Ht 5' 6" (1 676 m)   Wt 83 9 kg (185 lb)   SpO2 96%   BMI 29 86 kg/m²     Constitutional:normal, well developed, well nourished, alert, in no distress and non-toxic and no overt pain behavior    Eyes:anicteric  HEENT:grossly intact  Neck:supple, symmetric, trachea midline and no masses   Pulmonary:even and unlabored  Cardiovascular:No edema or pitting edema present  Skin:Normal without rashes or lesions and well hydrated  Psychiatric:Mood and affect appropriate  Neurologic:Cranial Nerves II-XII grossly intact  Musculoskeletal:normal    Imaging

## 2022-10-10 ENCOUNTER — OFFICE VISIT (OUTPATIENT)
Dept: URGENT CARE | Age: 47
End: 2022-10-10
Payer: MEDICARE

## 2022-10-10 VITALS
OXYGEN SATURATION: 99 % | TEMPERATURE: 99.5 F | HEART RATE: 85 BPM | SYSTOLIC BLOOD PRESSURE: 118 MMHG | RESPIRATION RATE: 18 BRPM | DIASTOLIC BLOOD PRESSURE: 76 MMHG

## 2022-10-10 DIAGNOSIS — J40 BRONCHITIS: Primary | ICD-10-CM

## 2022-10-10 PROCEDURE — G0463 HOSPITAL OUTPT CLINIC VISIT: HCPCS | Performed by: PHYSICIAN ASSISTANT

## 2022-10-10 PROCEDURE — 99213 OFFICE O/P EST LOW 20 MIN: CPT | Performed by: PHYSICIAN ASSISTANT

## 2022-10-10 RX ORDER — ALBUTEROL SULFATE 90 UG/1
2 AEROSOL, METERED RESPIRATORY (INHALATION) EVERY 6 HOURS PRN
Qty: 18 G | Refills: 0 | Status: SHIPPED | OUTPATIENT
Start: 2022-10-10 | End: 2022-11-09

## 2022-10-10 NOTE — PATIENT INSTRUCTIONS
Acute Bronchitis   AMBULATORY CARE:   Acute bronchitis  is swelling and irritation in your lungs  It is usually caused by a virus and most often happens in the winter  Bronchitis may also be caused by bacteria or by a chemical irritant, such as smoke  Common symptoms include the following:   Cough that lasts up to 3 weeks, stuffy nose    Hoarseness, sore throat    A fever and chills    Feeling more tired than usual, and body aches    Wheezing or pain when you breathe or cough    Seek care immediately if:   You cough up blood  Your lips or fingernails turn blue  You feel like you are not getting enough air when you breathe  Call your doctor if:   Your symptoms do not go away or get worse, even after treatment  Your cough does not get better within 4 weeks  You have questions or concerns about your condition or care  Medicines: You may  need any of the following:  Cough suppressants  decrease your urge to cough  Decongestants  help loosen mucus in your lungs and make it easier to cough up  This can help you breathe easier  Inhalers  may be given  Your healthcare provider may give you one or more inhalers to help you breathe easier and cough less  An inhaler gives your medicine to open your airways  Ask your healthcare provider to show you how to use your inhaler correctly  Antibiotics  may be given for up to 5 days if your bronchitis is caused by bacteria  Acetaminophen  decreases pain and fever  It is available without a doctor's order  Ask how much to take and how often to take it  Follow directions  Read the labels of all other medicines you are using to see if they also contain acetaminophen, or ask your doctor or pharmacist  Acetaminophen can cause liver damage if not taken correctly  Do not use more than 4 grams (4,000 milligrams) total of acetaminophen in one day  NSAIDs  help decrease swelling and pain or fever   This medicine is available with or without a doctor's order  NSAIDs can cause stomach bleeding or kidney problems in certain people  If you take blood thinner medicine, always ask your healthcare provider if NSAIDs are safe for you  Always read the medicine label and follow directions  Take your medicine as directed  Contact your healthcare provider if you think your medicine is not helping or if you have side effects  Tell him of her if you are allergic to any medicine  Keep a list of the medicines, vitamins, and herbs you take  Include the amounts, and when and why you take them  Bring the list or the pill bottles to follow-up visits  Carry your medicine list with you in case of an emergency  Self-care:   Drink liquids as directed  You may need to drink more liquids than usual to stay hydrated  Ask how much liquid to drink each day and which liquids are best for you  Use a cool mist humidifier  to increase air moisture in your home  This may make it easier for you to breathe and help decrease your cough  Get more rest   Rest helps your body to heal  Slowly start to do more each day  Rest when you feel it is needed  Avoid irritants in the air  Avoid chemicals, fumes, and dust  Wear a face mask if you must work around dust or fumes  Stay inside on days when air pollution levels are high  If you have allergies, stay inside when pollen counts are high  Do not use aerosol products, such as spray-on deodorant, bug spray, and hair spray  Do not smoke or be around others who are smoking  Nicotine and other chemicals in cigarettes and cigars can cause lung damage  Ask your healthcare provider for information if you currently smoke and need help to quit  E-cigarettes or smokeless tobacco still contain nicotine  Talk to your healthcare provider before you use these products  Prevent acute bronchitis:       Ask about vaccines you may need  Get a flu vaccine each year as soon as recommended, usually in September or October   Ask your healthcare provider if you should also get a pneumonia or COVID-19 vaccine  Your healthcare provider can tell you if you should also get other vaccines, and when to get them  Prevent the spread of germs  You can decrease your risk for acute bronchitis and other illnesses by doing the following:    Wash your hands often with soap and water  Carry germ-killing hand lotion or gel with you  You can use the lotion or gel to clean your hands when soap and water are not available  Do not touch your eyes, nose, or mouth unless you have washed your hands first     Always cover your mouth when you cough to prevent the spread of germs  It is best to cough into a tissue or your shirt sleeve instead of into your hand  Ask those around you to cover their mouths when they cough  Try to avoid people who have a cold or the flu  If you are sick, stay away from others as much as possible  Follow up with your doctor as directed:  Write down questions you have so you will remember to ask them during your follow-up visits  © Copyright [employer] 2022 Information is for End User's use only and may not be sold, redistributed or otherwise used for commercial purposes  All illustrations and images included in CareNotes® are the copyrighted property of A D A M [employer] Inc  or Kiran [employer]ial[employer]   The above information is an  only  It is not intended as medical advice for individual conditions or treatments  Talk to your doctor, nurse or pharmacist before following any medical regimen to see if it is safe and effective for you

## 2022-10-10 NOTE — PROGRESS NOTES
[address]        NAME: Michael Britton is a 50 y o  female  : 1975    MRN: [id number]  DATE: October 10, 2022  TIME: 1:53 PM    Assessment and Plan   Bronchitis [J40]  1  Bronchitis           Patient Instructions     Patient Instructions     Acute Bronchitis   AMBULATORY CARE:   Acute bronchitis  is swelling and irritation in your lungs  It is usually caused by a virus and most often happens in the winter  Bronchitis may also be caused by bacteria or by a chemical irritant, such as smoke  Common symptoms include the following:   · Cough that lasts up to 3 weeks, stuffy nose    · Hoarseness, sore throat    · A fever and chills    · Feeling more tired than usual, and body aches    · Wheezing or pain when you breathe or cough    Seek care immediately if:   · You cough up blood  · Your lips or fingernails turn blue  · You feel like you are not getting enough air when you breathe  Call your doctor if:   · Your symptoms do not go away or get worse, even after treatment  · Your cough does not get better within 4 weeks  · You have questions or concerns about your condition or care  Medicines: You may  need any of the following:  · Cough suppressants  decrease your urge to cough  · Decongestants  help loosen mucus in your lungs and make it easier to cough up  This can help you breathe easier  · Inhalers  may be given  Your healthcare provider may give you one or more inhalers to help you breathe easier and cough less  An inhaler gives your medicine to open your airways  Ask your healthcare provider to show you how to use your inhaler correctly  · Antibiotics  may be given for up to 5 days if your bronchitis is caused by bacteria  · Acetaminophen  decreases pain and fever  It is available without a doctor's order  Ask how much to take and how often to take it  Follow directions   Read the labels of all other medicines you are using to see if they also contain acetaminophen, or ask your doctor or pharmacist  Acetaminophen can cause liver damage if not taken correctly  Do not use more than 4 grams (4,000 milligrams) total of acetaminophen in one day  · NSAIDs  help decrease swelling and pain or fever  This medicine is available with or without a doctor's order  NSAIDs can cause stomach bleeding or kidney problems in certain people  If you take blood thinner medicine, always ask your healthcare provider if NSAIDs are safe for you  Always read the medicine label and follow directions  · Take your medicine as directed  Contact your healthcare provider if you think your medicine is not helping or if you have side effects  Tell him of her if you are allergic to any medicine  Keep a list of the medicines, vitamins, and herbs you take  Include the amounts, and when and why you take them  Bring the list or the pill bottles to follow-up visits  Carry your medicine list with you in case of an emergency  Self-care:   · Drink liquids as directed  You may need to drink more liquids than usual to stay hydrated  Ask how much liquid to drink each day and which liquids are best for you  · Use a cool mist humidifier  to increase air moisture in your home  This may make it easier for you to breathe and help decrease your cough  · Get more rest   Rest helps your body to heal  Slowly start to do more each day  Rest when you feel it is needed  · Avoid irritants in the air  Avoid chemicals, fumes, and dust  Wear a face mask if you must work around dust or fumes  Stay inside on days when air pollution levels are high  If you have allergies, stay inside when pollen counts are high  Do not use aerosol products, such as spray-on deodorant, bug spray, and hair spray  · Do not smoke or be around others who are smoking  Nicotine and other chemicals in cigarettes and cigars can cause lung damage   Ask your healthcare provider for information if you currently smoke and need help to quit  E-cigarettes or smokeless tobacco still contain nicotine  Talk to your healthcare provider before you use these products  Prevent acute bronchitis:       1  Ask about vaccines you may need  Get a flu vaccine each year as soon as recommended, usually in September or October  Ask your healthcare provider if you should also get a pneumonia or COVID-19 vaccine  Your healthcare provider can tell you if you should also get other vaccines, and when to get them  2  Prevent the spread of germs  You can decrease your risk for acute bronchitis and other illnesses by doing the following:    ? Wash your hands often with soap and water  Carry germ-killing hand lotion or gel with you  You can use the lotion or gel to clean your hands when soap and water are not available  ? Do not touch your eyes, nose, or mouth unless you have washed your hands first     ? Always cover your mouth when you cough to prevent the spread of germs  It is best to cough into a tissue or your shirt sleeve instead of into your hand  Ask those around you to cover their mouths when they cough  ? Try to avoid people who have a cold or the flu  If you are sick, stay away from others as much as possible  Follow up with your doctor as directed:  Write down questions you have so you will remember to ask them during your follow-up visits  © Copyright [employer] 2022 Information is for End User's use only and may not be sold, redistributed or otherwise used for commercial purposes  All illustrations and images included in CareNotes® are the copyrighted property of A [employer] A M [employer] Inc  or Kiran Crow  The above information is an  only  It is not intended as medical advice for individual conditions or treatments  Talk to your doctor, nurse or pharmacist before following any medical regimen to see if it is safe and effective for you  Follow up with PCP in 3-5 days  Proceed to  ER if symptoms worsen      Chief Complaint     Chief Complaint   Patient presents with   • Cold Like Symptoms     Patient relates nasal congestion and chest congestion  Cough  Ears feel blocked  Has been happing for past two weeks  OTC medications not helping  History of Present Illness       Cough  This is a new problem  The current episode started in the past 7 days  The problem has been unchanged  The problem occurs hourly  The cough is productive of sputum  Associated symptoms include nasal congestion, postnasal drip, rhinorrhea and wheezing  Pertinent negatives include no chest pain, chills, ear pain, fever, headaches, myalgias, rash, sore throat or shortness of breath  Nothing aggravates the symptoms  She has tried OTC cough suppressant for the symptoms  The treatment provided mild relief  Patient is a pack per day smoker since the age of 10    Review of Systems   Review of Systems   Constitutional: Negative for chills, fatigue and fever  HENT: Positive for postnasal drip and rhinorrhea  Negative for congestion, ear pain, hearing loss, sinus pressure, sinus pain and sore throat  Eyes: Negative for pain and discharge  Respiratory: Positive for cough and wheezing  Negative for chest tightness and shortness of breath  Cardiovascular: Negative for chest pain  Gastrointestinal: Negative for abdominal pain, constipation, nausea and vomiting  Genitourinary: Negative for difficulty urinating  Musculoskeletal: Negative for arthralgias and myalgias  Skin: Negative for rash  Neurological: Negative for dizziness and headaches  Psychiatric/Behavioral: Negative for behavioral problems  Current Medications       Current Outpatient Medications:   •  busPIRone (BUSPAR) 15 mg tablet, , Disp: , Rfl:   •  clonazePAM (KlonoPIN) 0 5 mg tablet, TAKE 0 5-1 TAB BY MOUTH TWICE A DAY AS NEEDED FOR ANXIETY, Disp: , Rfl:   •  cycloSPORINE (RESTASIS) 0 05 % ophthalmic emulsion, Administer 1 drop to both eyes 2 (two) times a day  , Disp: , Rfl:   •  dicyclomine (BENTYL) 20 mg tablet, Take 20 mg by mouth 3 (three) times a day, Disp: , Rfl:   •  flecainide (TAMBOCOR) 100 mg tablet, , Disp: , Rfl:   •  flecainide (TAMBOCOR) 50 mg tablet, 150 mg , Disp: , Rfl:   •  hyoscyamine (LEVSIN/SL) 0 125 mg SL tablet, Take 1 tablet (0 125 mg total) by mouth every 6 (six) hours as needed for cramping, Disp: 60 tablet, Rfl: 3  •  levothyroxine 100 mcg tablet, Take 112 mcg by mouth daily  , Disp: , Rfl:   •  lithium 300 MG tablet, , Disp: , Rfl:   •  oxyCODONE-acetaminophen (PERCOCET) 7 5-325 MG per tablet, Take 1 tablet by mouth every 6 (six) hours as needed for moderate pain , Disp: , Rfl:   •  pantoprazole (PROTONIX) 40 mg tablet, Take 1 tablet by mouth daily, Disp: , Rfl:   •  pregabalin (LYRICA) 150 mg capsule, Take 1 capsule (150 mg total) by mouth 2 (two) times a day, Disp: 60 capsule, Rfl: 2  •  tiZANidine (ZANAFLEX) 4 mg tablet, TAKE 1 TABLET (4 MG TOTAL) BY MOUTH EVERY 8 (EIGHT) HOURS AS NEEDED FOR MUSCLE SPASMS, Disp: 270 tablet, Rfl: 1  •  venlafaxine (EFFEXOR-XR) 150 mg 24 hr capsule, Take 150 mg by mouth daily  , Disp: , Rfl:   •  venlafaxine (EFFEXOR-XR) 37 5 mg 24 hr capsule, Take 37 5 mg by mouth daily  , Disp: , Rfl:   •  ZOLMitriptan (ZOMIG) 5 MG tablet, Take 5 mg by mouth once as needed for migraine May repeat in 2 hours if unresolved  Do not exceed 10 mg in 24 hours  , Disp: , Rfl:   •  albuterol (VENTOLIN HFA) 90 mcg/act inhaler, Inhale 2 puffs every 6 (six) hours as needed for wheezing (Patient not taking: No sig reported), Disp: 18 g, Rfl: 0  •  ARIPiprazole (ABILIFY) 5 mg tablet, Take 5 mg by mouth daily, Disp: , Rfl:   •  asenapine (SAPHRIS) 5 mg SL tablet, Place 5 mg under the tongue daily at bedtime   (Patient not taking: No sig reported), Disp: , Rfl:   •  benzonatate (TESSALON) 200 MG capsule, Take 1 capsule (200 mg total) by mouth 3 (three) times a day as needed for cough (Patient not taking: No sig reported), Disp: 20 capsule, Rfl: 0  •  brompheniramine-pseudoephedrine-DM 30-2-10 MG/5ML syrup, Take 10 mL by mouth 4 (four) times a day as needed for congestion or cough (Patient not taking: Reported on 10/10/2022), Disp: 120 mL, Rfl: 0  •  hydrOXYzine HCL (ATARAX) 25 mg tablet, Take 25-50 mg by mouth every 6 (six) hours as needed (Patient not taking: Reported on 10/10/2022), Disp: , Rfl:   •  levothyroxine 137 mcg tablet, , Disp: , Rfl:   •  metoprolol succinate (TOPROL-XL) 25 mg 24 hr tablet, Take 25 mg by mouth 2 (two) times a day, Disp: , Rfl:   •  topiramate (TOPAMAX) 100 mg tablet, Take 100 mg by mouth 2 (two) times a day  (Patient not taking: No sig reported), Disp: , Rfl:   •  zolpidem (AMBIEN CR) 12 5 MG CR tablet, Take 10 mg by mouth daily at bedtime as needed for sleep  (Patient not taking: No sig reported), Disp: , Rfl:     Current Allergies     Allergies as of 10/10/2022 - Reviewed 10/10/2022   Allergen Reaction Noted   • Pollen extract  08/16/2017            The following portions of the patient's history were reviewed and updated as appropriate: allergies, current medications, past family history, past medical history, past social history, past surgical history and problem list      Past Medical History:   Diagnosis Date   • Anxiety    • Bipolar 1 disorder ([facility] [address] )    • Depression    • Disease of thyroid gland    • Fibromyalgia, primary    • GERD (gastroesophageal reflux disease) 2015   • Inflammatory bowel disease    • Thyroid disease        Past Surgical History:   Procedure Laterality Date   • APPENDECTOMY     • EPIDURAL BLOCK INJECTION  2017? • KNEE SURGERY     • ORTHOPEDIC SURGERY  2008   • TOTAL THYROIDECTOMY         Family History   Problem Relation Age of Onset   • Cancer Mother    • Depression Mother    • Diabetes Mother    • GI problems Mother    • Hypertension Mother    • Mental illness Mother    • Migraines Mother    • No Known Problems Father          Medications have been verified          Objective   /76   Pulse 85   Temp 99 5 °F (37 5 °C)   Resp 18   SpO2 99%   No LMP recorded  Patient has had an ablation  Physical Exam     Physical Exam  Vitals and nursing note reviewed  Constitutional:       Appearance: She is well-developed  HENT:      Right Ear: Tympanic membrane and external ear normal       Left Ear: Tympanic membrane and external ear normal       Nose: Mucosal edema and congestion present  Mouth/Throat:      Mouth: Mucous membranes are moist       Pharynx: Pharyngeal swelling and posterior oropharyngeal erythema present  Cardiovascular:      Rate and Rhythm: Normal rate and regular rhythm  Heart sounds: Normal heart sounds, S1 normal and S2 normal  No murmur heard  Pulmonary:      Effort: Pulmonary effort is normal  No respiratory distress  Breath sounds: Normal breath sounds  No wheezing or rales  Chest:      Chest wall: No tenderness  Musculoskeletal:      Cervical back: Normal range of motion  No edema  Lymphadenopathy:      Cervical: No cervical adenopathy  Skin:     General: Skin is warm and dry  Findings: No rash  Neurological:      Mental Status: She is alert and oriented to person, place, and time     Psychiatric:         Speech: Speech normal          Behavior: Behavior normal

## 2023-01-04 ENCOUNTER — OFFICE VISIT (OUTPATIENT)
Dept: PAIN MEDICINE | Facility: CLINIC | Age: 48
End: 2023-01-04

## 2023-01-04 VITALS
OXYGEN SATURATION: 96 % | SYSTOLIC BLOOD PRESSURE: 118 MMHG | BODY MASS INDEX: 30.12 KG/M2 | DIASTOLIC BLOOD PRESSURE: 72 MMHG | WEIGHT: 187.4 LBS | HEART RATE: 71 BPM | HEIGHT: 66 IN

## 2023-01-04 DIAGNOSIS — M70.62 GREATER TROCHANTERIC BURSITIS OF BOTH HIPS: ICD-10-CM

## 2023-01-04 DIAGNOSIS — G89.4 CHRONIC PAIN SYNDROME: ICD-10-CM

## 2023-01-04 DIAGNOSIS — M47.816 LUMBAR SPONDYLOSIS: ICD-10-CM

## 2023-01-04 DIAGNOSIS — M46.1 SACROILIITIS (HCC): ICD-10-CM

## 2023-01-04 DIAGNOSIS — M79.18 MYOFASCIAL PAIN SYNDROME: Primary | ICD-10-CM

## 2023-01-04 DIAGNOSIS — M70.61 GREATER TROCHANTERIC BURSITIS OF BOTH HIPS: ICD-10-CM

## 2023-01-04 RX ORDER — PREGABALIN 200 MG/1
200 CAPSULE ORAL 2 TIMES DAILY
Qty: 60 CAPSULE | Refills: 2 | Status: SHIPPED | OUTPATIENT
Start: 2023-01-04

## 2023-01-04 RX ORDER — BACLOFEN 10 MG/1
10 TABLET ORAL 3 TIMES DAILY
Qty: 90 TABLET | Refills: 2 | Status: SHIPPED | OUTPATIENT
Start: 2023-01-04

## 2023-01-04 RX ORDER — LINACLOTIDE 72 UG/1
1 CAPSULE, GELATIN COATED ORAL DAILY
COMMUNITY
Start: 2022-11-10

## 2023-01-04 NOTE — PATIENT INSTRUCTIONS
Sacroiliac Joint Injection   WHAT YOU NEED TO KNOW:   What do I need to know about a sacroiliac joint injection? A sacroiliac (SI) joint injection is done to diagnose or treat pain from sacroiliac joint syndrome  The pain caused by this syndrome may be felt in your lower back, buttocks, groin, and your thigh  How do I prepare for an SI injection? Your healthcare provider will ask you to not take any pain medicine the day of the injection  Ask him if there are any other medicines you should not take  You will need to find someone to drive you home after your procedure  What will happen during the SI injection? You will be awake for your injection  You may be given calming medicine if you are anxious  You will lie on your stomach with a pillow under your abdomen  Your healthcare provider will give you an injection of medicine to numb the area  He may use an x-ray, ultrasound, or CT scan to find the area to inject  You may also be given an injection of contrast material to help your SI joint show up better  Then, your healthcare provider will inject local anesthesia, antiinflammatory medicine, or both into your SI joint  Healthcare providers will watch you closely for any problems for up to 30 minutes after your injection  Your healthcare provider will check to see if your pain decreases after the injection  What are the risks of an SI injection? You may have some weakness for a short time after your injection  The SI injection can cause bleeding, infection, and pain  It can also cause temporary weakness in your leg and problems urinating  You may have an allergic reaction to the medicine that is injected into your SI joint  Your pain may return and you may need more treatment  CARE AGREEMENT:   You have the right to help plan your care  Learn about your health condition and how it may be treated  Discuss treatment options with your healthcare providers to decide what care you want to receive   You always have the right to refuse treatment  The above information is an  only  It is not intended as medical advice for individual conditions or treatments  Talk to your doctor, nurse or pharmacist before following any medical regimen to see if it is safe and effective for you  © Copyright [employer] 2022 Information is for End User's use only and may not be sold, redistributed or otherwise used for commercial purposes   All illustrations and images included in CareNotes® are the copyrighted property of A D A M , Inc  or [address] [place] [address]

## 2023-01-04 NOTE — PROGRESS NOTES
118/72Assessment:  1  Myofascial pain syndrome    2  Sacroiliitis ([address] )    3  Lumbar spondylosis    4  Greater trochanteric bursitis of both hips    5  Chronic pain syndrome        Plan:  While the patient was in the office today, I did have a thorough conversation regarding their chronic pain syndrome, medication management, and treatment plan options  Based on patient report and physical exam, the patient's symptomatology does seem to be consistent with sacroiliac mediated pain from sacroiliitis  We will schedule the patient for a bilateral SIJ injection to decrease any inflammatory component of the patient's pain symptoms  Consider ultrasound-guided greater trochanteric bursa injections in the future  We spent time discussing medication optimization and I have increased her pregabalin to 200 mg twice daily, discontinued the tizanidine due to lack of clinical effect and proceeded with a trial of baclofen 10 mg 3 times daily as needed  I advised the patient that they should not drive or operate machinery while on this medication until they see how it affects them, as it could cause lethargy and mental cloudiness  I advised the patient to call our office if they experience any side effects or issues with the medication changes  The patient verbalized an understanding  Complete risks and benefits including bleeding, infection, tissue reaction, nerve injury and allergic reaction were discussed  The approach was demonstrated using models and literature was provided  Verbal and written consent was obtained  My impressions and treatment recommendations were discussed in detail with the patient who verbalized understanding and had no further questions  Discharge instructions were provided  I personally saw and examined the patient and I agree with the above discussed plan of care      Orders Placed This Encounter   Procedures   • FL spine and pain procedure     Standing Status:   Future     Standing Expiration Date:   1/4/2027     Order Specific Question:   Reason for Exam:     Answer:   GAYLE SIJ     Order Specific Question:   Is the patient pregnant? Answer:   No     Order Specific Question:   Anticoagulant hold needed? Answer:   no     New Medications Ordered This Visit   Medications   • Linzess 72 MCG CAPS     Sig: Take 1 capsule by mouth daily   • pregabalin (LYRICA) 200 MG capsule     Sig: Take 1 capsule (200 mg total) by mouth 2 (two) times a day     Dispense:  60 capsule     Refill:  2   • baclofen 10 mg tablet     Sig: Take 1 tablet (10 mg total) by mouth 3 (three) times a day     Dispense:  90 tablet     Refill:  2       History of Present Illness:  Misbah Marcial is a 50 y o  female who presents for a follow up office visit in regards to Back Pain (Follow up)  The patient’s current symptoms include chronic low back pain that she presently rates a 7 out of 10 on the pain scale describes it as a constant burning, sharp, throbbing and shooting pain across the lower back region with intermittent referred pain patterns into bilateral hips and buttocks  The patient denies any lower extremity radicular features  She has increased pain with bending and prolonged standing  She states that the hip pain is localized laterally and affects her ability to lie on either side at nighttime  She is taking pregabalin 150 mg twice daily which is alleviating approximately 25% of her pain  She is also taking tizanidine mostly at nighttime which she feels helps her sleep but she still experiences muscle tightness and spasm throughout the day  I have personally reviewed and/or updated the patient's past medical history, past surgical history, family history, social history, current medications, allergies, and vital signs today  Review of Systems   Constitutional: Negative for chills and fever  HENT: Negative for ear pain and sore throat  Eyes: Negative for pain and visual disturbance     Respiratory: Negative for cough and shortness of breath  Cardiovascular: Negative for chest pain and palpitations  Gastrointestinal: Negative for abdominal pain and vomiting  Genitourinary: Negative for dysuria and hematuria  Musculoskeletal: Positive for arthralgias and myalgias (hands)  Negative for back pain  Skin: Negative for color change and rash  Neurological: Negative for seizures and syncope  All other systems reviewed and are negative  Patient Active Problem List   Diagnosis   • Bipolar 1 disorder ([place] [address] )   • Thyroid disease   • Anxiety   • Chronic pain syndrome   • Lumbar radiculopathy   • Foraminal stenosis of lumbar region       Past Medical History:   Diagnosis Date   • Anxiety    • Bipolar 1 disorder ([place] [address] )    • Depression    • Disease of thyroid gland    • Fibromyalgia, primary    • GERD (gastroesophageal reflux disease) 2015   • Inflammatory bowel disease    • Thyroid disease        Past Surgical History:   Procedure Laterality Date   • APPENDECTOMY     • EPIDURAL BLOCK INJECTION  2017?    • KNEE SURGERY     • ORTHOPEDIC SURGERY  2008   • TOTAL THYROIDECTOMY         Family History   Problem Relation Age of Onset   • Cancer Mother    • Depression Mother    • Diabetes Mother    • GI problems Mother    • Hypertension Mother    • Mental illness Mother    • Migraines Mother    • No Known Problems Father        Social History     Occupational History   • Not on file   Tobacco Use   • Smoking status: Every Day     Packs/day: 0 50     Years: 20 00     Pack years: 10 00     Types: Cigarettes   • Smokeless tobacco: Never   Vaping Use   • Vaping Use: Never used   Substance and Sexual Activity   • Alcohol use: Yes     Comment: socially    • Drug use: No   • Sexual activity: Yes     Partners: Male     Birth control/protection: None       Current Outpatient Medications on File Prior to Visit   Medication Sig   • busPIRone (BUSPAR) 15 mg tablet    • clonazePAM (KlonoPIN) 0 5 mg tablet TAKE 0 5-1 TAB BY MOUTH TWICE A DAY AS NEEDED FOR ANXIETY   • cycloSPORINE (RESTASIS) 0 05 % ophthalmic emulsion Administer 1 drop to both eyes 2 (two) times a day  • dicyclomine (BENTYL) 20 mg tablet Take 20 mg by mouth 3 (three) times a day   • flecainide (TAMBOCOR) 100 mg tablet    • flecainide (TAMBOCOR) 50 mg tablet 150 mg    • hyoscyamine (LEVSIN/SL) 0 125 mg SL tablet Take 1 tablet (0 125 mg total) by mouth every 6 (six) hours as needed for cramping   • levothyroxine 100 mcg tablet Take 112 mcg by mouth daily  • Linzess 72 MCG CAPS Take 1 capsule by mouth daily   • lithium 300 MG tablet    • pantoprazole (PROTONIX) 40 mg tablet Take 1 tablet by mouth daily   • venlafaxine (EFFEXOR-XR) 150 mg 24 hr capsule Take 150 mg by mouth daily  • venlafaxine (EFFEXOR-XR) 37 5 mg 24 hr capsule Take 37 5 mg by mouth daily  • ZOLMitriptan (ZOMIG) 5 MG tablet Take 5 mg by mouth once as needed for migraine May repeat in 2 hours if unresolved  Do not exceed 10 mg in 24 hours  • [DISCONTINUED] pregabalin (LYRICA) 150 mg capsule Take 1 capsule (150 mg total) by mouth 2 (two) times a day   • [DISCONTINUED] tiZANidine (ZANAFLEX) 4 mg tablet TAKE 1 TABLET (4 MG TOTAL) BY MOUTH EVERY 8 (EIGHT) HOURS AS NEEDED FOR MUSCLE SPASMS   • albuterol (VENTOLIN HFA) 90 mcg/act inhaler Inhale 2 puffs every 6 (six) hours as needed for wheezing (Patient not taking: Reported on 9/28/2020)   • ARIPiprazole (ABILIFY) 5 mg tablet Take 5 mg by mouth daily   • asenapine (SAPHRIS) 5 mg SL tablet Place 5 mg under the tongue daily at bedtime   (Patient not taking: Reported on 10/5/2022)   • benzonatate (TESSALON) 200 MG capsule Take 1 capsule (200 mg total) by mouth 3 (three) times a day as needed for cough (Patient not taking: Reported on 8/25/2020)   • brompheniramine-pseudoephedrine-DM 30-2-10 MG/5ML syrup Take 10 mL by mouth 4 (four) times a day as needed for congestion or cough (Patient not taking: Reported on 10/10/2022)   • hydrOXYzine HCL (ATARAX) 25 mg tablet Take 25-50 mg by mouth every 6 (six) hours as needed (Patient not taking: Reported on 10/10/2022)   • levothyroxine 137 mcg tablet  (Patient not taking: Reported on 10/5/2022)   • metoprolol succinate (TOPROL-XL) 25 mg 24 hr tablet Take 25 mg by mouth 2 (two) times a day   • topiramate (TOPAMAX) 100 mg tablet Take 100 mg by mouth 2 (two) times a day  (Patient not taking: Reported on 10/5/2022)   • zolpidem (AMBIEN CR) 12 5 MG CR tablet Take 10 mg by mouth daily at bedtime as needed for sleep  (Patient not taking: Reported on 10/5/2022)   • [DISCONTINUED] oxyCODONE-acetaminophen (PERCOCET) 7 5-325 MG per tablet Take 1 tablet by mouth every 6 (six) hours as needed for moderate pain  (Patient not taking: Reported on 1/4/2023)     No current facility-administered medications on file prior to visit  Allergies   Allergen Reactions   • Pollen Extract        Physical Exam:    /72   Pulse 71   Ht 5' 6" (1 676 m)   Wt 85 kg (187 lb 6 4 oz)   SpO2 96%   BMI 30 25 kg/m²     Constitutional:normal, well developed, well nourished, alert, in no distress and non-toxic and no overt pain behavior    Eyes:anicteric  HEENT:grossly intact  Neck:supple, symmetric, trachea midline and no masses   Pulmonary:even and unlabored  Cardiovascular:No edema or pitting edema present  Skin:Normal without rashes or lesions and well hydrated  Psychiatric:Mood and affect appropriate  Neurologic:Cranial Nerves II-XII grossly intact  Musculoskeletal: Bilateral + Chyna finger sign + Patricks test +Gaenslen's test+ Posterior pelvic pain provocation    Imaging

## 2023-02-06 ENCOUNTER — OFFICE VISIT (OUTPATIENT)
Dept: URGENT CARE | Age: 48
End: 2023-02-06

## 2023-02-06 VITALS
HEART RATE: 84 BPM | BODY MASS INDEX: 30.22 KG/M2 | TEMPERATURE: 97.4 F | WEIGHT: 188 LBS | OXYGEN SATURATION: 96 % | HEIGHT: 66 IN | RESPIRATION RATE: 20 BRPM | DIASTOLIC BLOOD PRESSURE: 72 MMHG | SYSTOLIC BLOOD PRESSURE: 124 MMHG

## 2023-02-06 DIAGNOSIS — R05.1 ACUTE COUGH: ICD-10-CM

## 2023-02-06 DIAGNOSIS — R50.9 FEVER, UNSPECIFIED FEVER CAUSE: ICD-10-CM

## 2023-02-06 DIAGNOSIS — R09.81 NASAL CONGESTION: Primary | ICD-10-CM

## 2023-02-06 RX ORDER — AMOXICILLIN AND CLAVULANATE POTASSIUM 875; 125 MG/1; MG/1
1 TABLET, FILM COATED ORAL EVERY 12 HOURS SCHEDULED
Qty: 14 TABLET | Refills: 0 | Status: SHIPPED | OUTPATIENT
Start: 2023-02-06 | End: 2023-02-13

## 2023-02-06 RX ORDER — BENZONATATE 200 MG/1
200 CAPSULE ORAL 3 TIMES DAILY PRN
Qty: 20 CAPSULE | Refills: 0 | Status: SHIPPED | OUTPATIENT
Start: 2023-02-06

## 2023-02-06 NOTE — PATIENT INSTRUCTIONS
Use zyrtec or allegra daily  Use flonase 1-2 sprays daily in each nare  Use nasal saline to the nose,   Use humidifer in room  Symptoms worsen go to ER  Rest    Use mucinex as as directed,   follow up with pcp  Take meds as directed     Covid/flu Swab takes 1-2 days and results on mychart    No Bacterial infection noted on visit     Follow CDC guidelines

## 2023-02-06 NOTE — LETTER
February 6, 2023     Patient: Tildon Klinefelter  YOB: 1975  Date of Visit: 2/6/2023      To Whom it May Concern:    Tildon Klinefelter is under my professional care  Linda Watt was seen in my office on 2/6/2023  Linda Watt may return to work on February 8, 2023 as long as fever free for 24 hours without medication  If labs are positive please excuse patient from work till February 9, 2023 and must wear a mask for the next 5 days while working         Sincerely,          TITI Joyner        CC: No Recipients

## 2023-02-06 NOTE — PROGRESS NOTES
NAME: Abilio Stafford is a 50 y o  female  : 1975    MRN: [id number]    /72 (BP Location: Right arm, Patient Position: Sitting, Cuff Size: Large)   Pulse 84   Temp (!) 97 4 °F (36 3 °C) (Temporal)   Resp 20   Ht 5' 6" (1 676 m)   Wt 85 3 kg (188 lb)   SpO2 96%   BMI 30 34 kg/m²     12:39 PM    Assessment and Plan   Nasal congestion [R09 81]  1  Nasal congestion  Cov/Flu-Collected at [facility] or [facility]    amoxicillin-clavulanate (AUGMENTIN) 875-125 mg per tablet      2  Acute cough  Cov/Flu-Collected at [facility] or [facility]    benzonatate (TESSALON) 200 MG capsule    amoxicillin-clavulanate (AUGMENTIN) 875-125 mg per tablet      3  Fever, unspecified fever cause  Cov/Flu-Collected at [facility]    amoxicillin-clavulanate (AUGMENTIN) 875-125 mg per tablet          Virginia Redding was seen today for cold like symptoms  Diagnoses and all orders for this visit:    Nasal congestion  -     Cov/Flu-Collected at [facility] or [facility]  -     amoxicillin-clavulanate (AUGMENTIN) 875-125 mg per tablet; Take 1 tablet by mouth every 12 (twelve) hours for 7 days    Acute cough  -     Cov/Flu-Collected at [facility] or [facility]  -     benzonatate (TESSALON) 200 MG capsule; Take 1 capsule (200 mg total) by mouth 3 (three) times a day as needed for cough  -     amoxicillin-clavulanate (AUGMENTIN) 875-125 mg per tablet; Take 1 tablet by mouth every 12 (twelve) hours for 7 days    Fever, unspecified fever cause  -     Cov/Flu-Collected at [facility] or [facility]  -     amoxicillin-clavulanate (AUGMENTIN) 875-125 mg per tablet;  Take 1 tablet by mouth every 12 (twelve) hours for 7 days        Patient Instructions   Patient Instructions   Use zyrtec or allegra daily  Use flonase 1-2 sprays daily in each nare  Use nasal saline to the nose,   Use humidifer in room  Symptoms worsen go to ER  Rest    Use mucinex as as directed,   follow up with pcp  Take meds as directed     Covid/flu Swab takes 1-2 days and results on mychart    No Bacterial infection noted on visit     Follow CDC guidelines          Proceed to the nearest ER if symptoms worsen, Follow up with your PCP  Continue to social distance, wash your hands, and wear your masks  Please continue to follow the CDC  gov guidelines daily for they are subject to change on COVID-19    Chief Complaint     Chief Complaint   Patient presents with   • Cold Like Symptoms     Pt started on Wed with body aches, low grade fever and nasal drainage  Covid test neg on Wed Saturday started with a temp of 102 6 and Sun with sinus pain/pressure, ear fullness, PND and fatigue  Taking Mucinex and Theraflu  History of Present Illness     Patient presents with:  Cold Like Symptoms: Pt started on Wed with body aches, low grade fever and nasal drainage  Covid test neg on Wed Saturday started with a temp of 102 6 and Sun with sinus pain/pressure, ear fullness, PND and fatigue  Taking Mucinex and Theraflu  Review of Systems   Review of Systems   Constitutional: Positive for fever  Negative for activity change, appetite change, chills and fatigue  HENT: Positive for congestion, ear pain, postnasal drip, rhinorrhea, sinus pressure, sinus pain, sneezing and sore throat  Negative for ear discharge  Eyes: Negative for pain  Respiratory: Positive for cough  Negative for chest tightness, shortness of breath and wheezing  Cardiovascular: Negative  Gastrointestinal: Negative for nausea  Genitourinary: Negative  Musculoskeletal: Negative  Negative for neck pain  Skin: Negative  Allergic/Immunologic: Environmental allergies: Sinus  Neurological: Positive for headaches  Psychiatric/Behavioral: Negative            Current Medications       Current Outpatient Medications:   •  amoxicillin-clavulanate (AUGMENTIN) 875-125 mg per tablet, Take 1 tablet by mouth every 12 (twelve) hours for 7 days, Disp: 14 tablet, Rfl: 0  •  baclofen 10 mg tablet, Take 1 tablet (10 mg total) by mouth 3 (three) times a day, Disp: 90 tablet, Rfl: 2  •  benzonatate (TESSALON) 200 MG capsule, Take 1 capsule (200 mg total) by mouth 3 (three) times a day as needed for cough, Disp: 20 capsule, Rfl: 0  •  busPIRone (BUSPAR) 15 mg tablet, , Disp: , Rfl:   •  clonazePAM (KlonoPIN) 0 5 mg tablet, TAKE 0 5-1 TAB BY MOUTH TWICE A DAY AS NEEDED FOR ANXIETY, Disp: , Rfl:   •  flecainide (TAMBOCOR) 100 mg tablet, , Disp: , Rfl:   •  flecainide (TAMBOCOR) 50 mg tablet, 150 mg , Disp: , Rfl:   •  hyoscyamine (LEVSIN/SL) 0 125 mg SL tablet, Take 1 tablet (0 125 mg total) by mouth every 6 (six) hours as needed for cramping, Disp: 60 tablet, Rfl: 3  •  levothyroxine 100 mcg tablet, Take 112 mcg by mouth daily  , Disp: , Rfl:   •  Linzess 72 MCG CAPS, Take 1 capsule by mouth daily, Disp: , Rfl:   •  lithium 300 MG tablet, , Disp: , Rfl:   •  pantoprazole (PROTONIX) 40 mg tablet, Take 1 tablet by mouth daily, Disp: , Rfl:   •  pregabalin (LYRICA) 200 MG capsule, Take 1 capsule (200 mg total) by mouth 2 (two) times a day, Disp: 60 capsule, Rfl: 2  •  venlafaxine (EFFEXOR-XR) 150 mg 24 hr capsule, Take 150 mg by mouth daily  , Disp: , Rfl:   •  venlafaxine (EFFEXOR-XR) 37 5 mg 24 hr capsule, Take 37 5 mg by mouth daily  , Disp: , Rfl:   •  albuterol (VENTOLIN HFA) 90 mcg/act inhaler, Inhale 2 puffs every 6 (six) hours as needed for wheezing (Patient not taking: Reported on 9/28/2020), Disp: 18 g, Rfl: 0  •  ARIPiprazole (ABILIFY) 5 mg tablet, Take 5 mg by mouth daily, Disp: , Rfl:   •  asenapine (SAPHRIS) 5 mg SL tablet, Place 5 mg under the tongue daily at bedtime   (Patient not taking: Reported on 10/5/2022), Disp: , Rfl:   •  brompheniramine-pseudoephedrine-DM 30-2-10 MG/5ML syrup, Take 10 mL by mouth 4 (four) times a day as needed for congestion or cough (Patient not taking: Reported on 10/10/2022), Disp: 120 mL, Rfl: 0  •  cycloSPORINE (RESTASIS) 0 05 % ophthalmic emulsion, Administer 1 drop to both eyes 2 (two) times a day  (Patient not taking: Reported on 2/6/2023), Disp: , Rfl:   •  dicyclomine (BENTYL) 20 mg tablet, Take 20 mg by mouth 3 (three) times a day (Patient not taking: Reported on 2/6/2023), Disp: , Rfl:   •  hydrOXYzine HCL (ATARAX) 25 mg tablet, Take 25-50 mg by mouth every 6 (six) hours as needed (Patient not taking: Reported on 10/10/2022), Disp: , Rfl:   •  levothyroxine 137 mcg tablet, , Disp: , Rfl:   •  metoprolol succinate (TOPROL-XL) 25 mg 24 hr tablet, Take 25 mg by mouth 2 (two) times a day, Disp: , Rfl:   •  topiramate (TOPAMAX) 100 mg tablet, Take 100 mg by mouth 2 (two) times a day  (Patient not taking: Reported on 10/5/2022), Disp: , Rfl:   •  ZOLMitriptan (ZOMIG) 5 MG tablet, Take 5 mg by mouth once as needed for migraine May repeat in 2 hours if unresolved  Do not exceed 10 mg in 24 hours  (Patient not taking: Reported on 2/6/2023), Disp: , Rfl:   •  zolpidem (AMBIEN CR) 12 5 MG CR tablet, Take 10 mg by mouth daily at bedtime as needed for sleep  (Patient not taking: Reported on 10/5/2022), Disp: , Rfl:     Current Allergies     Allergies as of 02/06/2023 - Reviewed 02/06/2023   Allergen Reaction Noted   • Pollen extract  08/16/2017              Past Medical History:   Diagnosis Date   • Anxiety    • Bipolar 1 disorder ([facility] [address] )    • Depression    • Disease of thyroid gland    • Fibromyalgia, primary    • GERD (gastroesophageal reflux disease) 2015   • Inflammatory bowel disease    • Thyroid disease        Past Surgical History:   Procedure Laterality Date   • APPENDECTOMY     • EPIDURAL BLOCK INJECTION  2017? • KNEE SURGERY     • ORTHOPEDIC SURGERY  2008   • TOTAL THYROIDECTOMY         Family History   Problem Relation Age of Onset   • Cancer Mother    • Depression Mother    • Diabetes Mother    • GI problems Mother    • Hypertension Mother    • Mental illness Mother    • Migraines Mother    • No Known Problems Father          Medications have been verified      The following portions of the patient's history were reviewed and updated as appropriate: allergies, current medications, past family history, past medical history, past social history, past surgical history and problem list     Objective   /72 (BP Location: Right arm, Patient Position: Sitting, Cuff Size: Large)   Pulse 84   Temp (!) 97 4 °F (36 3 °C) (Temporal)   Resp 20   Ht 5' 6" (1 676 m)   Wt 85 3 kg (188 lb)   SpO2 96%   BMI 30 34 kg/m²      Physical Exam     Physical Exam  Constitutional:       General: She is awake  Appearance: She is well-developed  She is not ill-appearing  HENT:      Head: Normocephalic  Right Ear: Hearing, tympanic membrane, ear canal and external ear normal       Left Ear: Hearing, tympanic membrane, ear canal and external ear normal       Nose: Mucosal edema, congestion and rhinorrhea present  Right Turbinates: Swollen  Left Turbinates: Swollen  Right Sinus: Maxillary sinus tenderness present  Left Sinus: Maxillary sinus tenderness present  Mouth/Throat:      Pharynx: Uvula midline  Posterior oropharyngeal erythema present  Cardiovascular:      Rate and Rhythm: Normal rate and regular rhythm  Heart sounds: Normal heart sounds  Pulmonary:      Effort: Pulmonary effort is normal       Breath sounds: Normal breath sounds and air entry  No decreased breath sounds, wheezing, rhonchi or rales  Neurological:      Mental Status: She is alert and oriented to person, place, and time  Psychiatric:         Behavior: Behavior is cooperative  Note: Portions of this record may have been created with voice recognition software  Occasional wrong word or "sound a like" substitutions may have occurred due to the inherent limitations of voice recognition software  Please read the chart carefully and recognize, using context, where substitutions have occurred  TITI Orozco

## 2023-02-07 ENCOUNTER — TELEPHONE (OUTPATIENT)
Dept: PAIN MEDICINE | Facility: CLINIC | Age: 48
End: 2023-02-07

## 2023-02-07 LAB
FLUAV RNA RESP QL NAA+PROBE: NEGATIVE
FLUBV RNA RESP QL NAA+PROBE: NEGATIVE
SARS-COV-2 RNA RESP QL NAA+PROBE: NEGATIVE

## 2023-02-07 NOTE — TELEPHONE ENCOUNTER
Caller: Alon Starks    Doctor: Lori Torres    Reason for call: Patient called to cnl procedure on  2/9- pt is taking antibitics- pls call to reschedule for a later date - thank you    Call back#: [phone]

## 2023-02-08 NOTE — TELEPHONE ENCOUNTER
Patient needs to be rescheduled  Left message for patient to call me back DIRECTLY to schedule  Left my DIRECT phone # 2x on message     [phone]

## 2023-02-09 NOTE — TELEPHONE ENCOUNTER
Called patient again to reschedule  Left message for patient to call me back DIRECTLY to schedule  Left my DIRECT phone # 2x on message     [phone]

## 2023-03-20 ENCOUNTER — HOSPITAL ENCOUNTER (EMERGENCY)
Facility: HOSPITAL | Age: 48
Discharge: HOME | End: 2023-03-20
Attending: EMERGENCY MEDICINE

## 2023-03-20 ENCOUNTER — OFFICE VISIT (OUTPATIENT)
Dept: URGENT CARE | Age: 48
End: 2023-03-20

## 2023-03-20 ENCOUNTER — APPOINTMENT (EMERGENCY)
Dept: RADIOLOGY | Facility: HOSPITAL | Age: 48
End: 2023-03-20

## 2023-03-20 VITALS
DIASTOLIC BLOOD PRESSURE: 80 MMHG | RESPIRATION RATE: 18 BRPM | OXYGEN SATURATION: 95 % | HEART RATE: 88 BPM | SYSTOLIC BLOOD PRESSURE: 116 MMHG | TEMPERATURE: 96 F

## 2023-03-20 VITALS
HEART RATE: 54 BPM | TEMPERATURE: 97.9 F | DIASTOLIC BLOOD PRESSURE: 72 MMHG | OXYGEN SATURATION: 94 % | SYSTOLIC BLOOD PRESSURE: 116 MMHG | RESPIRATION RATE: 12 BRPM

## 2023-03-20 DIAGNOSIS — K52.9 GASTROENTERITIS: Primary | ICD-10-CM

## 2023-03-20 DIAGNOSIS — R11.10 VOMITING AND DIARRHEA: ICD-10-CM

## 2023-03-20 DIAGNOSIS — R10.11 RUQ PAIN: Primary | ICD-10-CM

## 2023-03-20 DIAGNOSIS — R19.7 VOMITING AND DIARRHEA: ICD-10-CM

## 2023-03-20 LAB
ALBUMIN SERPL BCP-MCNC: 3.7 G/DL (ref 3.5–5)
ALP SERPL-CCNC: 61 U/L (ref 46–116)
ALT SERPL W P-5'-P-CCNC: 18 U/L (ref 12–78)
ANION GAP SERPL CALCULATED.3IONS-SCNC: 4 MMOL/L (ref 4–13)
AST SERPL W P-5'-P-CCNC: 14 U/L (ref 5–45)
BASOPHILS # BLD AUTO: 0.03 THOUSANDS/ÂΜL (ref 0–0.1)
BASOPHILS NFR BLD AUTO: 1 % (ref 0–1)
BILIRUB SERPL-MCNC: 0.49 MG/DL (ref 0.2–1)
BUN SERPL-MCNC: 20 MG/DL (ref 5–25)
CALCIUM SERPL-MCNC: 8.9 MG/DL (ref 8.3–10.1)
CHLORIDE SERPL-SCNC: 111 MMOL/L (ref 96–108)
CO2 SERPL-SCNC: 24 MMOL/L (ref 21–32)
CREAT SERPL-MCNC: 0.79 MG/DL (ref 0.6–1.3)
EOSINOPHIL # BLD AUTO: 0.16 THOUSAND/ÂΜL (ref 0–0.61)
EOSINOPHIL NFR BLD AUTO: 3 % (ref 0–6)
ERYTHROCYTE [DISTWIDTH] IN BLOOD BY AUTOMATED COUNT: 12.2 % (ref 11.6–15.1)
GFR SERPL CREATININE-BSD FRML MDRD: 87 ML/MIN/1.73SQ M
GLUCOSE SERPL-MCNC: 86 MG/DL (ref 65–140)
HCG SERPL QL: NEGATIVE
HCT VFR BLD AUTO: 42.9 % (ref 34.8–46.1)
HGB BLD-MCNC: 15.1 G/DL (ref 11.5–15.4)
IMM GRANULOCYTES # BLD AUTO: 0.01 THOUSAND/UL (ref 0–0.2)
IMM GRANULOCYTES NFR BLD AUTO: 0 % (ref 0–2)
LIPASE SERPL-CCNC: 156 U/L (ref 73–393)
LYMPHOCYTES # BLD AUTO: 1.62 THOUSANDS/ÂΜL (ref 0.6–4.47)
LYMPHOCYTES NFR BLD AUTO: 34 % (ref 14–44)
MCH RBC QN AUTO: 31.1 PG (ref 26.8–34.3)
MCHC RBC AUTO-ENTMCNC: 35.2 G/DL (ref 31.4–37.4)
MCV RBC AUTO: 88 FL (ref 82–98)
MONOCYTES # BLD AUTO: 0.51 THOUSAND/ÂΜL (ref 0.17–1.22)
MONOCYTES NFR BLD AUTO: 11 % (ref 4–12)
NEUTROPHILS # BLD AUTO: 2.46 THOUSANDS/ÂΜL (ref 1.85–7.62)
NEUTS SEG NFR BLD AUTO: 51 % (ref 43–75)
NRBC BLD AUTO-RTO: 0 /100 WBCS
PLATELET # BLD AUTO: 199 THOUSANDS/UL (ref 149–390)
PMV BLD AUTO: 10.8 FL (ref 8.9–12.7)
POTASSIUM SERPL-SCNC: 3.4 MMOL/L (ref 3.5–5.3)
PROT SERPL-MCNC: 7.2 G/DL (ref 6.4–8.4)
RBC # BLD AUTO: 4.86 MILLION/UL (ref 3.81–5.12)
SODIUM SERPL-SCNC: 139 MMOL/L (ref 135–147)
WBC # BLD AUTO: 4.79 THOUSAND/UL (ref 4.31–10.16)

## 2023-03-20 RX ORDER — DICYCLOMINE HCL 20 MG
20 TABLET ORAL ONCE
Status: COMPLETED | OUTPATIENT
Start: 2023-03-20 | End: 2023-03-20

## 2023-03-20 RX ORDER — ONDANSETRON 2 MG/ML
4 INJECTION INTRAMUSCULAR; INTRAVENOUS ONCE
Status: COMPLETED | OUTPATIENT
Start: 2023-03-20 | End: 2023-03-20

## 2023-03-20 RX ORDER — DICYCLOMINE HCL 20 MG
20 TABLET ORAL 2 TIMES DAILY
Qty: 20 TABLET | Refills: 0 | Status: SHIPPED | OUTPATIENT
Start: 2023-03-20

## 2023-03-20 RX ORDER — ONDANSETRON 4 MG/1
4 TABLET, FILM COATED ORAL EVERY 6 HOURS
Qty: 12 TABLET | Refills: 0 | Status: SHIPPED | OUTPATIENT
Start: 2023-03-20

## 2023-03-20 RX ORDER — KETOROLAC TROMETHAMINE 30 MG/ML
15 INJECTION, SOLUTION INTRAMUSCULAR; INTRAVENOUS ONCE
Status: COMPLETED | OUTPATIENT
Start: 2023-03-20 | End: 2023-03-20

## 2023-03-20 RX ORDER — LIDOCAINE HYDROCHLORIDE 20 MG/ML
10 SOLUTION OROPHARYNGEAL ONCE
Status: COMPLETED | OUTPATIENT
Start: 2023-03-20 | End: 2023-03-20

## 2023-03-20 RX ORDER — MAGNESIUM HYDROXIDE/ALUMINUM HYDROXICE/SIMETHICONE 120; 1200; 1200 MG/30ML; MG/30ML; MG/30ML
30 SUSPENSION ORAL ONCE
Status: COMPLETED | OUTPATIENT
Start: 2023-03-20 | End: 2023-03-20

## 2023-03-20 RX ADMIN — DICYCLOMINE HYDROCHLORIDE 20 MG: 20 TABLET ORAL at 22:50

## 2023-03-20 RX ADMIN — IOHEXOL 90 ML: 350 INJECTION, SOLUTION INTRAVENOUS at 21:26

## 2023-03-20 RX ADMIN — ALUMINUM HYDROXIDE, MAGNESIUM HYDROXIDE, AND SIMETHICONE 30 ML: 200; 200; 20 SUSPENSION ORAL at 21:16

## 2023-03-20 RX ADMIN — ONDANSETRON 4 MG: 2 INJECTION INTRAMUSCULAR; INTRAVENOUS at 20:20

## 2023-03-20 RX ADMIN — SODIUM CHLORIDE 1000 ML: 0.9 INJECTION, SOLUTION INTRAVENOUS at 20:14

## 2023-03-20 RX ADMIN — LIDOCAINE HYDROCHLORIDE 10 ML: 20 SOLUTION ORAL; TOPICAL at 21:16

## 2023-03-20 RX ADMIN — KETOROLAC TROMETHAMINE 15 MG: 30 INJECTION, SOLUTION INTRAMUSCULAR; INTRAVENOUS at 21:16

## 2023-03-20 NOTE — Clinical Note
Izabella Whatley was seen and treated in our emergency department on 3/20/2023  Diagnosis: gastroenteritis    Imer Lovett  may return to work on return date  She may return on this date: 03/23/2023         If you have any questions or concerns, please don't hesitate to call        Eren Denis MD    ______________________________           _______________          _______________  [facility] Representative                              Date                                Time

## 2023-03-20 NOTE — Clinical Note
Parish Knapp accompanied Norman Gandhi to the emergency department on 3/20/2023  Return date if applicable: 19/42/5505        If you have any questions or concerns, please don't hesitate to call        Hector Ash MD

## 2023-03-20 NOTE — PROGRESS NOTES
[address] [facility] [employer] [facility]        NAME: Mikael Black is a 46 y o  female  : 1975    MRN: [id number]  DATE: 2023  TIME: 5:48 PM      Assessment and Plan     RUQ pain [R10 11]  1  RUQ pain  Transfer to other facility      2  Vomiting and diarrhea  Transfer to other facility        Patient is agreeable to proceed to the ER for further evaluation  Requesting to go to [facility]  Stable at this time  Patient Instructions     Proceed to the ER for further evaluation  Chief Complaint     Chief Complaint   Patient presents with   • Abdominal Pain     Pt c/o epigastric pain radiating to right rib cage area  Vomit, diarrhea for the past 4 days  Pt ate [employer] marlen[employer] a Friday night and sx started after that, dtr ate too but was ok  Taking Tylenol  History of Present Illness     Patient is a 49-year-old female who presents with right upper quadrant pain worsening over the past 4 days  States her symptoms were improving and then got worse over the past day  Reports vomiting and diarrhea  States she is unable to keep anything down  Reports decreased urine output  Reports history of gallstones  States she does still have a gallbladder  Denies urinary symptoms  Review of Systems     Review of Systems   Constitutional: Positive for appetite change  Negative for fever  Gastrointestinal: Positive for abdominal pain, diarrhea and vomiting  Genitourinary: Positive for decreased urine volume  All other systems reviewed and are negative          Current Medications       Current Outpatient Medications:   •  baclofen 10 mg tablet, Take 1 tablet (10 mg total) by mouth 3 (three) times a day, Disp: 90 tablet, Rfl: 2  •  busPIRone (BUSPAR) 15 mg tablet, , Disp: , Rfl:   •  clonazePAM (KlonoPIN) 0 5 mg tablet, TAKE 0 5-1 TAB BY MOUTH TWICE A DAY AS NEEDED FOR ANXIETY, Disp: , Rfl:   •  flecainide (TAMBOCOR) 100 mg tablet, , Disp: , Rfl:   •  flecainide (TAMBOCOR) 50 mg tablet, 150 mg , Disp: , Rfl:   •  levothyroxine 100 mcg tablet, Take 112 mcg by mouth daily  , Disp: , Rfl:   •  Linzess 72 MCG CAPS, Take 1 capsule by mouth daily, Disp: , Rfl:   •  lithium 300 MG tablet, , Disp: , Rfl:   •  pantoprazole (PROTONIX) 40 mg tablet, Take 1 tablet by mouth daily, Disp: , Rfl:   •  pregabalin (LYRICA) 200 MG capsule, Take 1 capsule (200 mg total) by mouth 2 (two) times a day, Disp: 60 capsule, Rfl: 2  •  venlafaxine (EFFEXOR-XR) 150 mg 24 hr capsule, Take 150 mg by mouth daily  , Disp: , Rfl:   •  venlafaxine (EFFEXOR-XR) 37 5 mg 24 hr capsule, Take 37 5 mg by mouth daily  , Disp: , Rfl:   •  albuterol (VENTOLIN HFA) 90 mcg/act inhaler, Inhale 2 puffs every 6 (six) hours as needed for wheezing (Patient not taking: Reported on 9/28/2020), Disp: 18 g, Rfl: 0  •  ARIPiprazole (ABILIFY) 5 mg tablet, Take 5 mg by mouth daily, Disp: , Rfl:   •  asenapine (SAPHRIS) 5 mg SL tablet, Place 5 mg under the tongue daily at bedtime  (Patient not taking: Reported on 10/5/2022), Disp: , Rfl:   •  benzonatate (TESSALON) 200 MG capsule, Take 1 capsule (200 mg total) by mouth 3 (three) times a day as needed for cough, Disp: 20 capsule, Rfl: 0  •  brompheniramine-pseudoephedrine-DM 30-2-10 MG/5ML syrup, Take 10 mL by mouth 4 (four) times a day as needed for congestion or cough (Patient not taking: Reported on 10/10/2022), Disp: 120 mL, Rfl: 0  •  cycloSPORINE (RESTASIS) 0 05 % ophthalmic emulsion, Administer 1 drop to both eyes 2 (two) times a day   (Patient not taking: Reported on 2/6/2023), Disp: , Rfl:   •  dicyclomine (BENTYL) 20 mg tablet, Take 20 mg by mouth 3 (three) times a day (Patient not taking: Reported on 2/6/2023), Disp: , Rfl:   •  hydrOXYzine HCL (ATARAX) 25 mg tablet, Take 25-50 mg by mouth every 6 (six) hours as needed (Patient not taking: Reported on 10/10/2022), Disp: , Rfl:   •  hyoscyamine (LEVSIN/SL) 0 125 mg SL tablet, Take 1 tablet (0 125 mg total) by mouth every 6 (six) hours as needed for cramping (Patient not taking: Reported on 3/20/2023), Disp: 60 tablet, Rfl: 3  •  levothyroxine 137 mcg tablet, , Disp: , Rfl:   •  metoprolol succinate (TOPROL-XL) 25 mg 24 hr tablet, Take 25 mg by mouth 2 (two) times a day, Disp: , Rfl:   •  topiramate (TOPAMAX) 100 mg tablet, Take 100 mg by mouth 2 (two) times a day  (Patient not taking: Reported on 10/5/2022), Disp: , Rfl:   •  ZOLMitriptan (ZOMIG) 5 MG tablet, Take 5 mg by mouth once as needed for migraine May repeat in 2 hours if unresolved  Do not exceed 10 mg in 24 hours  (Patient not taking: Reported on 2/6/2023), Disp: , Rfl:   •  zolpidem (AMBIEN CR) 12 5 MG CR tablet, Take 10 mg by mouth daily at bedtime as needed for sleep  (Patient not taking: Reported on 10/5/2022), Disp: , Rfl:     Current Allergies     Allergies as of 03/20/2023 - Reviewed 03/20/2023   Allergen Reaction Noted   • Pollen extract  08/16/2017              The following portions of the patient's history were reviewed and updated as appropriate: allergies, current medications, past family history, past medical history, past social history, past surgical history and problem list      Past Medical History:   Diagnosis Date   • Anxiety    • Bipolar 1 disorder ([facility] [address] )    • Depression    • Disease of thyroid gland    • Fibromyalgia, primary    • GERD (gastroesophageal reflux disease) 2015   • Inflammatory bowel disease    • Thyroid disease        Past Surgical History:   Procedure Laterality Date   • APPENDECTOMY     • EPIDURAL BLOCK INJECTION  2017? • KNEE SURGERY     • ORTHOPEDIC SURGERY  2008   • TOTAL THYROIDECTOMY         Family History   Problem Relation Age of Onset   • Cancer Mother    • Depression Mother    • Diabetes Mother    • GI problems Mother    • Hypertension Mother    • Mental illness Mother    • Migraines Mother    • No Known Problems Father          Medications have been verified          Objective     /80   Pulse 88   Temp (!) 96 °F (35 6 °C) (Tympanic)   Resp 18   SpO2 95%   No LMP recorded  Patient has had an ablation  Physical Exam     Physical Exam  Vitals and nursing note reviewed  Constitutional:       General: She is awake  She is not in acute distress  Appearance: Normal appearance  She is not ill-appearing, toxic-appearing or diaphoretic  Cardiovascular:      Rate and Rhythm: Normal rate  Pulses: Normal pulses  Heart sounds: Normal heart sounds, S1 normal and S2 normal    Pulmonary:      Effort: Pulmonary effort is normal       Breath sounds: Normal breath sounds and air entry  Abdominal:      General: Abdomen is flat  Bowel sounds are normal       Palpations: Abdomen is soft  Tenderness: There is abdominal tenderness in the right upper quadrant  Positive signs include Arce's sign  Skin:     General: Skin is warm  Capillary Refill: Capillary refill takes less than 2 seconds  Neurological:      Mental Status: She is alert  Psychiatric:         Mood and Affect: Mood normal          Behavior: Behavior normal          Thought Content:  Thought content normal          Judgment: Judgment normal

## 2023-03-21 LAB
ATRIAL RATE: 57 BPM
P AXIS: 57 DEGREES
PR INTERVAL: 160 MS
QRS AXIS: 7 DEGREES
QRSD INTERVAL: 78 MS
QT INTERVAL: 424 MS
QTC INTERVAL: 412 MS
T WAVE AXIS: 9 DEGREES
VENTRICULAR RATE: 57 BPM

## 2023-03-21 NOTE — DISCHARGE INSTRUCTIONS
You were seen in the ED for gastroenteritis  Return to the ED for any worsening symptoms or new symptoms  Follow up with your primary care doctor as soon as possible  Take medications as directed for your symptoms

## 2023-03-21 NOTE — ED PROVIDER NOTES
History  Chief Complaint   Patient presents with   • Abdominal Pain     Friday with vomiting and diarrhea  Yesterday was able to keep down toast and jello, and then today pain in epigastric area and around to my back and now I cant even keep down water  49-year-old female patient with history of thyroid disease, bipolar, IBD presenting with epigastric pain radiating to back onset 3 days ago  Patient also states that she was having vomiting and diarrhea  Patient states watery stools  Patient states that she had couple episodes of diarrhea daily  Patient states dry heaving but no vomiting in the past 2 days  Denies fever, chest pain, shortness of breath, urinary symptoms  Patient states decreased oral intake secondary to her nausea  Denies previous history of this  Patient states that she has had a history of gallstones in the past           Prior to Admission Medications   Prescriptions Last Dose Informant Patient Reported? Taking? ARIPiprazole (ABILIFY) 5 mg tablet   Yes No   Sig: Take 5 mg by mouth daily   Linzess 72 MCG CAPS   Yes No   Sig: Take 1 capsule by mouth daily   ZOLMitriptan (ZOMIG) 5 MG tablet   Yes No   Sig: Take 5 mg by mouth once as needed for migraine May repeat in 2 hours if unresolved  Do not exceed 10 mg in 24 hours  Patient not taking: Reported on 2/6/2023   albuterol (VENTOLIN HFA) 90 mcg/act inhaler   No No   Sig: Inhale 2 puffs every 6 (six) hours as needed for wheezing   Patient not taking: Reported on 9/28/2020   asenapine (SAPHRIS) 5 mg SL tablet   Yes No   Sig: Place 5 mg under the tongue daily at bedtime     Patient not taking: Reported on 10/5/2022   baclofen 10 mg tablet   No No   Sig: Take 1 tablet (10 mg total) by mouth 3 (three) times a day   benzonatate (TESSALON) 200 MG capsule   No No   Sig: Take 1 capsule (200 mg total) by mouth 3 (three) times a day as needed for cough   brompheniramine-pseudoephedrine-DM 30-2-10 MG/5ML syrup   No No   Sig: Take 10 mL by mouth 4 (four) times a day as needed for congestion or cough   Patient not taking: Reported on 10/10/2022   busPIRone (BUSPAR) 15 mg tablet   Yes No   clonazePAM (KlonoPIN) 0 5 mg tablet   Yes No   Sig: TAKE 0 5-1 TAB BY MOUTH TWICE A DAY AS NEEDED FOR ANXIETY   cycloSPORINE (RESTASIS) 0 05 % ophthalmic emulsion   Yes No   Sig: Administer 1 drop to both eyes 2 (two) times a day  Patient not taking: Reported on 2023   dicyclomine (BENTYL) 20 mg tablet   Yes No   Sig: Take 20 mg by mouth 3 (three) times a day   Patient not taking: Reported on 2023   flecainide (TAMBOCOR) 100 mg tablet   Yes No   flecainide (TAMBOCOR) 50 mg tablet   Yes No   Si mg    hydrOXYzine HCL (ATARAX) 25 mg tablet   Yes No   Sig: Take 25-50 mg by mouth every 6 (six) hours as needed   Patient not taking: Reported on 10/10/2022   hyoscyamine (LEVSIN/SL) 0 125 mg SL tablet   No No   Sig: Take 1 tablet (0 125 mg total) by mouth every 6 (six) hours as needed for cramping   Patient not taking: Reported on 3/20/2023   levothyroxine 100 mcg tablet   Yes No   Sig: Take 112 mcg by mouth daily  levothyroxine 137 mcg tablet   Yes No   Patient not taking: Reported on 10/5/2022   lithium 300 MG tablet   Yes No   metoprolol succinate (TOPROL-XL) 25 mg 24 hr tablet   Yes No   Sig: Take 25 mg by mouth 2 (two) times a day   pantoprazole (PROTONIX) 40 mg tablet   Yes No   Sig: Take 1 tablet by mouth daily   pregabalin (LYRICA) 200 MG capsule   No No   Sig: Take 1 capsule (200 mg total) by mouth 2 (two) times a day   topiramate (TOPAMAX) 100 mg tablet   Yes No   Sig: Take 100 mg by mouth 2 (two) times a day  Patient not taking: Reported on 10/5/2022   venlafaxine (EFFEXOR-XR) 150 mg 24 hr capsule   Yes No   Sig: Take 150 mg by mouth daily  venlafaxine (EFFEXOR-XR) 37 5 mg 24 hr capsule   Yes No   Sig: Take 37 5 mg by mouth daily  zolpidem (AMBIEN CR) 12 5 MG CR tablet   Yes No   Sig: Take 10 mg by mouth daily at bedtime as needed for sleep  Patient not taking: Reported on 10/5/2022      Facility-Administered Medications: None       Past Medical History:   Diagnosis Date   • Anxiety    • Bipolar 1 disorder ([facility] [address] )    • Depression    • Disease of thyroid gland    • Fibromyalgia, primary    • GERD (gastroesophageal reflux disease) 2015   • Inflammatory bowel disease    • Thyroid disease        Past Surgical History:   Procedure Laterality Date   • APPENDECTOMY     • EPIDURAL BLOCK INJECTION  2017? • KNEE SURGERY     • ORTHOPEDIC SURGERY  2008   • TOTAL THYROIDECTOMY         Family History   Problem Relation Age of Onset   • Cancer Mother    • Depression Mother    • Diabetes Mother    • GI problems Mother    • Hypertension Mother    • Mental illness Mother    • Migraines Mother    • No Known Problems Father      I have reviewed and agree with the history as documented  E-Cigarette/Vaping   • E-Cigarette Use Never User      E-Cigarette/Vaping Substances   • Nicotine No    • THC No    • CBD No    • Flavoring No      Social History     Tobacco Use   • Smoking status: Every Day     Packs/day: 0 50     Years: 20 00     Pack years: 10 00     Types: Cigarettes   • Smokeless tobacco: Never   Vaping Use   • Vaping Use: Never used   Substance Use Topics   • Alcohol use: Yes     Comment: socially    • Drug use: No        Review of Systems   Gastrointestinal: Positive for abdominal pain, diarrhea, nausea and vomiting  All other systems reviewed and are negative  Physical Exam  ED Triage Vitals [03/20/23 1844]   Temperature Pulse Respirations Blood Pressure SpO2   97 9 °F (36 6 °C) 79 18 146/83 98 %      Temp Source Heart Rate Source Patient Position - Orthostatic VS BP Location FiO2 (%)   Oral Monitor Sitting Left arm --      Pain Score       7             Orthostatic Vital Signs  Vitals:    03/20/23 1844 03/20/23 2030   BP: 146/83 116/72   Pulse: 79 (!) 54   Patient Position - Orthostatic VS: Sitting Sitting       Physical Exam  Vitals reviewed  Constitutional:       Appearance: Normal appearance  HENT:      Head: Normocephalic and atraumatic  Nose: Nose normal       Mouth/Throat:      Mouth: Mucous membranes are moist       Pharynx: Oropharynx is clear  Eyes:      Extraocular Movements: Extraocular movements intact  Conjunctiva/sclera: Conjunctivae normal    Cardiovascular:      Rate and Rhythm: Normal rate and regular rhythm  Pulses: Normal pulses  Heart sounds: Normal heart sounds  Pulmonary:      Effort: Pulmonary effort is normal       Breath sounds: Normal breath sounds  Abdominal:      General: Bowel sounds are normal       Palpations: Abdomen is soft  Tenderness: There is abdominal tenderness in the right upper quadrant and epigastric area  Musculoskeletal:         General: Normal range of motion  Cervical back: Normal range of motion  Skin:     General: Skin is warm and dry  Neurological:      General: No focal deficit present  Mental Status: She is alert and oriented to person, place, and time  Mental status is at baseline           ED Medications  Medications   ketorolac (TORADOL) injection 15 mg (15 mg Intravenous Given 3/20/23 2116)   ondansetron (ZOFRAN) injection 4 mg (4 mg Intravenous Given 3/20/23 2020)   sodium chloride 0 9 % bolus 1,000 mL (0 mL Intravenous Stopped 3/20/23 2228)   Lidocaine Viscous HCl (XYLOCAINE) 2 % mucosal solution 10 mL (10 mL Swish & Swallow Given 3/20/23 2116)   aluminum-magnesium hydroxide-simethicone (MYLANTA) oral suspension 30 mL (30 mL Oral Given 3/20/23 2116)   iohexol (OMNIPAQUE) 350 MG/ML injection (SINGLE-DOSE) 100 mL (90 mL Intravenous Given 3/20/23 2126)   dicyclomine (BENTYL) tablet 20 mg (20 mg Oral Given 3/20/23 2250)       Diagnostic Studies  Results Reviewed     Procedure Component Value Units Date/Time    Comprehensive metabolic panel [[id number]]  (Abnormal) Collected: 03/20/23 2011    Lab Status: Final result Specimen: Blood from Arm, Right Updated: 03/20/23 2054     Sodium 139 mmol/L      Potassium 3 4 mmol/L      Chloride 111 mmol/L      CO2 24 mmol/L      ANION GAP 4 mmol/L      BUN 20 mg/dL      Creatinine 0 79 mg/dL      Glucose 86 mg/dL      Calcium 8 9 mg/dL      AST 14 U/L      ALT 18 U/L      Alkaline Phosphatase 61 U/L      Total Protein 7 2 g/dL      Albumin 3 7 g/dL      Total Bilirubin 0 49 mg/dL      eGFR 87 ml/min/1 73sq m     Narrative:      [place] guidelines for Chronic Kidney Disease (CKD):   •  Stage 1 with normal or high GFR (GFR > 90 mL/min/1 73 square meters)  •  Stage 2 Mild CKD (GFR = 60-89 mL/min/1 73 square meters)  •  Stage 3A Moderate CKD (GFR = 45-59 mL/min/1 73 square meters)  •  Stage 3B Moderate CKD (GFR = 30-44 mL/min/1 73 square meters)  •  Stage 4 Severe CKD (GFR = 15-29 mL/min/1 73 square meters)  •  Stage 5 End Stage CKD (GFR <15 mL/min/1 73 square meters)  Note: GFR calculation is accurate only with a steady state creatinine    Lipase [[id number]]  (Normal) Collected: 03/20/23 2011    Lab Status: Final result Specimen: Blood from Arm, Right Updated: 03/20/23 2049     Lipase 156 u/L     hCG, qualitative pregnancy [[id number]]  (Normal) Collected: 03/20/23 2011    Lab Status: Final result Specimen: Blood from Arm, Right Updated: 03/20/23 2049     Preg, Serum Negative    CBC and differential [[id number]] Collected: 03/20/23 2011    Lab Status: Final result Specimen: Blood from Arm, Right Updated: 03/20/23 2024     WBC 4 79 Thousand/uL      RBC 4 86 Million/uL      Hemoglobin 15 1 g/dL      Hematocrit 42 9 %      MCV 88 fL      MCH 31 1 pg      MCHC 35 2 g/dL      RDW 12 2 %      MPV 10 8 fL      Platelets 848 Thousands/uL      nRBC 0 /100 WBCs      Neutrophils Relative 51 %      Immat GRANS % 0 %      Lymphocytes Relative 34 %      Monocytes Relative 11 %      Eosinophils Relative 3 %      Basophils Relative 1 %      Neutrophils Absolute 2 46 Thousands/µL      Immature Grans Absolute 0 01 Thousand/uL Lymphocytes Absolute 1 62 Thousands/µL      Monocytes Absolute 0 51 Thousand/µL      Eosinophils Absolute 0 16 Thousand/µL      Basophils Absolute 0 03 Thousands/µL                  CT abdomen pelvis with contrast   Final Result by Vishnu Bran MD (03/20 2223)      No acute intra-abdominal abnormality  No free air or free fluid  There are a few loops of nondilated fluid-filled small bowel at the lower abdomen and pelvis as well as a small amount of liquid stool within the colon suggestive of a gastroenteritis/diarrhea  No evidence of large or small bowel obstruction  Workstation performed: [id number]               Procedures  Procedures      ED Course                             SBIRT 20yo+    Flowsheet Row Most Recent Value   SBIRT (25 yo +)    In order to provide better care to our patients, we are screening all of our patients for alcohol and drug use  Would it be okay to ask you these screening questions? Yes Filed at: 03/20/2023 2231   Initial Alcohol Screen: US AUDIT-C     1  How often do you have a drink containing alcohol? 0 Filed at: 03/20/2023 2231   2  How many drinks containing alcohol do you have on a typical day you are drinking? 0 Filed at: 03/20/2023 2231   3b  FEMALE Any Age, or MALE 65+: How often do you have 4 or more drinks on one occassion? 0 Filed at: 03/20/2023 2231   Audit-C Score 0 Filed at: 03/20/2023 2231   YESENIA: How many times in the past year have you    Used an illegal drug or used a prescription medication for non-medical reasons? Never Filed at: 03/20/2023 2231                Medical Decision Making  12-year-old female patient with history of thyroid disease, bipolar, IBD presenting with epigastric pain rating to back  Patient also having vomiting and diarrhea  Patient on exam has epigastric and right upper quadrant tenderness  Possible DDx includes gastroenteritis, pancreatitis, gallbladder etiology, acute abdominal infection    Labs show mild hypokalemia  CT abdomen pelvis shows gastroenteritis  Patient treated with Zofran, Toradol, Maalox, fluids, Bentyl with improvement of symptoms  Gastroenteritis:     Details: Patient has diarrhea and vomiting  CT shows gastroenteritis  Amount and/or Complexity of Data Reviewed  Labs: ordered  Radiology: ordered  Discussion of management or test interpretation with external provider(s): Discussed results with patient and available family  Stable for discharge with follow up with PCP  Discharged with bentyl and zofran  Return precautions given  Risk  OTC drugs  Prescription drug management  Disposition  Final diagnoses:   Gastroenteritis     Time reflects when diagnosis was documented in both MDM as applicable and the Disposition within this note     Time User Action Codes Description Comment    3/20/2023 10:30 PM Eleonore Current [facility] Add [R10 9] Abdominal pain     3/20/2023 10:31 PM Eleonore Current [facility] Remove [R10 9] Abdominal pain     3/20/2023 10:31 PM Eleonore Current [facility] Add [K52 9] Gastroenteritis       ED Disposition     ED Disposition   Discharge    Condition   Stable    Date/Time   Mon Mar 20, 2023 10:30 PM    Comment   Denisha Lloyd discharge to home/self care  Follow-up Information     Follow up With Specialties Details Why [address], DO Family Medicine Schedule an appointment as soon as possible for a visit   [place] [address]  [phone]            Discharge Medication List as of 3/20/2023 10:34 PM      START taking these medications    Details   !! dicyclomine (BENTYL) 20 mg tablet Take 1 tablet (20 mg total) by mouth 2 (two) times a day, Starting Mon 3/20/2023, Normal      ondansetron (ZOFRAN) 4 mg tablet Take 1 tablet (4 mg total) by mouth every 6 (six) hours, Starting Mon 3/20/2023, Normal       !! - Potential duplicate medications found  Please discuss with provider        CONTINUE these medications which have NOT CHANGED    Details   albuterol (VENTOLIN HFA) 90 mcg/act inhaler Inhale 2 puffs every 6 (six) hours as needed for wheezing, Starting Sun 1/5/2020, Normal      ARIPiprazole (ABILIFY) 5 mg tablet Take 5 mg by mouth daily, Starting Wed 11/27/2019, Until Wed 7/6/2022, Historical Med      asenapine (SAPHRIS) 5 mg SL tablet Place 5 mg under the tongue daily at bedtime  , Historical Med      baclofen 10 mg tablet Take 1 tablet (10 mg total) by mouth 3 (three) times a day, Starting Wed 1/4/2023, Normal      benzonatate (TESSALON) 200 MG capsule Take 1 capsule (200 mg total) by mouth 3 (three) times a day as needed for cough, Starting Mon 2/6/2023, Normal      brompheniramine-pseudoephedrine-DM 30-2-10 MG/5ML syrup Take 10 mL by mouth 4 (four) times a day as needed for congestion or cough, Starting Tue 12/7/2021, Normal      busPIRone (BUSPAR) 15 mg tablet Starting Tue 7/5/2022, Historical Med      clonazePAM (KlonoPIN) 0 5 mg tablet TAKE 0 5-1 TAB BY MOUTH TWICE A DAY AS NEEDED FOR ANXIETY, Historical Med      cycloSPORINE (RESTASIS) 0 05 % ophthalmic emulsion Administer 1 drop to both eyes 2 (two) times a day , Historical Med      !! dicyclomine (BENTYL) 20 mg tablet Take 20 mg by mouth 3 (three) times a day, Starting Wed 12/11/2019, Historical Med      !! flecainide (TAMBOCOR) 100 mg tablet Starting Mon 9/19/2022, Historical Med      !! flecainide (TAMBOCOR) 50 mg tablet 150 mg , Historical Med      hydrOXYzine HCL (ATARAX) 25 mg tablet Take 25-50 mg by mouth every 6 (six) hours as needed, Starting Wed 11/27/2019, Historical Med      hyoscyamine (LEVSIN/SL) 0 125 mg SL tablet Take 1 tablet (0 125 mg total) by mouth every 6 (six) hours as needed for cramping, Starting Tue 8/25/2020, Normal      !! levothyroxine 100 mcg tablet Take 112 mcg by mouth daily  , Historical Med      !! levothyroxine 137 mcg tablet Starting Mon 10/21/2019, Historical Med      Linzess 72 MCG CAPS Take 1 capsule by mouth daily, Starting Thu 11/10/2022, Historical Med      lithium 300 MG tablet Starting Tue 7/5/2022, Historical Med      metoprolol succinate (TOPROL-XL) 25 mg 24 hr tablet Take 25 mg by mouth 2 (two) times a day, Starting Fri 10/25/2019, Until Wed 7/6/2022, Historical Med      pantoprazole (PROTONIX) 40 mg tablet Take 1 tablet by mouth daily, Starting Tue 9/25/2018, Historical Med      pregabalin (LYRICA) 200 MG capsule Take 1 capsule (200 mg total) by mouth 2 (two) times a day, Starting Wed 1/4/2023, Normal      topiramate (TOPAMAX) 100 mg tablet Take 100 mg by mouth 2 (two) times a day , Historical Med      !! venlafaxine (EFFEXOR-XR) 150 mg 24 hr capsule Take 150 mg by mouth daily  , Historical Med      !! venlafaxine (EFFEXOR-XR) 37 5 mg 24 hr capsule Take 37 5 mg by mouth daily  , Historical Med      ZOLMitriptan (ZOMIG) 5 MG tablet Take 5 mg by mouth once as needed for migraine May repeat in 2 hours if unresolved  Do not exceed 10 mg in 24 hours  , Historical Med      zolpidem (AMBIEN CR) 12 5 MG CR tablet Take 10 mg by mouth daily at bedtime as needed for sleep , Historical Med       !! - Potential duplicate medications found  Please discuss with provider  No discharge procedures on file  PDMP Review       Value Time User    PDMP Reviewed  Yes 3/25/2022  2:15 PM Javier Fernandez DO           ED Provider  Attending physically available and evaluated Leonardo Nunn  I managed the patient along with the ED Attending      Electronically Signed by         Kanika Collazo MD  03/25/23 2699

## 2023-03-21 NOTE — ED ATTENDING ATTESTATION
3/20/2023  IKeyon MD, saw and evaluated the patient  I have discussed the patient with the resident/non-physician practitioner and agree with the resident's/non-physician practitioner's findings, Plan of Care, and MDM as documented in the resident's/non-physician practitioner's note, except where noted  All available labs and Radiology studies were reviewed  I was present for key portions of any procedure(s) performed by the resident/non-physician practitioner and I was immediately available to provide assistance  At this point I agree with the current assessment done in the Emergency Department  I have conducted an independent evaluation of this patient a history and physical is as follows:    47 yo female with epigastric pain that radiates to back with some nausea and vomiting a few days ago  No urinary symptoms  No fevers or chills  No CP or SOB  MDM:  Differential includes, but is not limited to PUD, Gastritis, biliary colic, pancreatitis, cholecytsitis, bowel obstruction  Will check labs and CT and treat symptomatically  Labs unremarkable, CT shows gastroenteritis, will dc home           ED Course         Critical Care Time  Procedures

## 2023-03-23 ENCOUNTER — HOSPITAL ENCOUNTER (OUTPATIENT)
Dept: RADIOLOGY | Facility: MEDICAL CENTER | Age: 48
End: 2023-03-23

## 2023-03-23 VITALS
DIASTOLIC BLOOD PRESSURE: 72 MMHG | OXYGEN SATURATION: 93 % | TEMPERATURE: 97.4 F | SYSTOLIC BLOOD PRESSURE: 102 MMHG | HEART RATE: 67 BPM | RESPIRATION RATE: 20 BRPM

## 2023-03-23 DIAGNOSIS — M46.1 SACROILIITIS (HCC): ICD-10-CM

## 2023-03-23 RX ORDER — METHYLPREDNISOLONE ACETATE 40 MG/ML
80 INJECTION, SUSPENSION INTRA-ARTICULAR; INTRALESIONAL; INTRAMUSCULAR; PARENTERAL; SOFT TISSUE ONCE
Status: COMPLETED | OUTPATIENT
Start: 2023-03-23 | End: 2023-03-23

## 2023-03-23 RX ORDER — LIDOCAINE HYDROCHLORIDE 10 MG/ML
2 INJECTION, SOLUTION EPIDURAL; INFILTRATION; INTRACAUDAL; PERINEURAL ONCE
Status: COMPLETED | OUTPATIENT
Start: 2023-03-23 | End: 2023-03-23

## 2023-03-23 RX ORDER — BUPIVACAINE HCL/PF 2.5 MG/ML
6 VIAL (ML) INJECTION ONCE
Status: COMPLETED | OUTPATIENT
Start: 2023-03-23 | End: 2023-03-23

## 2023-03-23 RX ADMIN — LIDOCAINE HYDROCHLORIDE 2 ML: 10 INJECTION, SOLUTION EPIDURAL; INFILTRATION; INTRACAUDAL; PERINEURAL at 09:28

## 2023-03-23 RX ADMIN — Medication 6 ML: at 09:30

## 2023-03-23 RX ADMIN — IOHEXOL 1 ML: 300 INJECTION, SOLUTION INTRAVENOUS at 09:28

## 2023-03-23 RX ADMIN — METHYLPREDNISOLONE ACETATE 80 MG: 40 INJECTION, SUSPENSION INTRA-ARTICULAR; INTRALESIONAL; INTRAMUSCULAR; SOFT TISSUE at 09:30

## 2023-03-23 NOTE — H&P
History of Present Illness: The patient is a 46 y o  female who presents with complaints of bilateral low back pain    Past Medical History:   Diagnosis Date   • Anxiety    • Bipolar 1 disorder ([facility] [address] )    • Depression    • Disease of thyroid gland    • Fibromyalgia, primary    • GERD (gastroesophageal reflux disease) 2015   • Inflammatory bowel disease    • Thyroid disease        Past Surgical History:   Procedure Laterality Date   • APPENDECTOMY     • EPIDURAL BLOCK INJECTION  2017? • KNEE SURGERY     • ORTHOPEDIC SURGERY  2008   • TOTAL THYROIDECTOMY           Current Outpatient Medications:   •  albuterol (VENTOLIN HFA) 90 mcg/act inhaler, Inhale 2 puffs every 6 (six) hours as needed for wheezing (Patient not taking: Reported on 9/28/2020), Disp: 18 g, Rfl: 0  •  ARIPiprazole (ABILIFY) 5 mg tablet, Take 5 mg by mouth daily, Disp: , Rfl:   •  asenapine (SAPHRIS) 5 mg SL tablet, Place 5 mg under the tongue daily at bedtime  (Patient not taking: Reported on 10/5/2022), Disp: , Rfl:   •  baclofen 10 mg tablet, Take 1 tablet (10 mg total) by mouth 3 (three) times a day, Disp: 90 tablet, Rfl: 2  •  benzonatate (TESSALON) 200 MG capsule, Take 1 capsule (200 mg total) by mouth 3 (three) times a day as needed for cough, Disp: 20 capsule, Rfl: 0  •  brompheniramine-pseudoephedrine-DM 30-2-10 MG/5ML syrup, Take 10 mL by mouth 4 (four) times a day as needed for congestion or cough (Patient not taking: Reported on 10/10/2022), Disp: 120 mL, Rfl: 0  •  busPIRone (BUSPAR) 15 mg tablet, , Disp: , Rfl:   •  clonazePAM (KlonoPIN) 0 5 mg tablet, TAKE 0 5-1 TAB BY MOUTH TWICE A DAY AS NEEDED FOR ANXIETY, Disp: , Rfl:   •  cycloSPORINE (RESTASIS) 0 05 % ophthalmic emulsion, Administer 1 drop to both eyes 2 (two) times a day   (Patient not taking: Reported on 2/6/2023), Disp: , Rfl:   •  dicyclomine (BENTYL) 20 mg tablet, Take 20 mg by mouth 3 (three) times a day (Patient not taking: Reported on 2/6/2023), Disp: , Rfl:   •  dicyclomine (BENTYL) 20 mg tablet, Take 1 tablet (20 mg total) by mouth 2 (two) times a day, Disp: 20 tablet, Rfl: 0  •  flecainide (TAMBOCOR) 100 mg tablet, , Disp: , Rfl:   •  flecainide (TAMBOCOR) 50 mg tablet, 150 mg , Disp: , Rfl:   •  hydrOXYzine HCL (ATARAX) 25 mg tablet, Take 25-50 mg by mouth every 6 (six) hours as needed (Patient not taking: Reported on 10/10/2022), Disp: , Rfl:   •  hyoscyamine (LEVSIN/SL) 0 125 mg SL tablet, Take 1 tablet (0 125 mg total) by mouth every 6 (six) hours as needed for cramping (Patient not taking: Reported on 3/20/2023), Disp: 60 tablet, Rfl: 3  •  levothyroxine 100 mcg tablet, Take 112 mcg by mouth daily  , Disp: , Rfl:   •  levothyroxine 137 mcg tablet, , Disp: , Rfl:   •  Linzess 72 MCG CAPS, Take 1 capsule by mouth daily, Disp: , Rfl:   •  lithium 300 MG tablet, , Disp: , Rfl:   •  metoprolol succinate (TOPROL-XL) 25 mg 24 hr tablet, Take 25 mg by mouth 2 (two) times a day, Disp: , Rfl:   •  ondansetron (ZOFRAN) 4 mg tablet, Take 1 tablet (4 mg total) by mouth every 6 (six) hours, Disp: 12 tablet, Rfl: 0  •  pantoprazole (PROTONIX) 40 mg tablet, Take 1 tablet by mouth daily, Disp: , Rfl:   •  pregabalin (LYRICA) 200 MG capsule, Take 1 capsule (200 mg total) by mouth 2 (two) times a day, Disp: 60 capsule, Rfl: 2  •  topiramate (TOPAMAX) 100 mg tablet, Take 100 mg by mouth 2 (two) times a day  (Patient not taking: Reported on 10/5/2022), Disp: , Rfl:   •  venlafaxine (EFFEXOR-XR) 150 mg 24 hr capsule, Take 150 mg by mouth daily  , Disp: , Rfl:   •  venlafaxine (EFFEXOR-XR) 37 5 mg 24 hr capsule, Take 37 5 mg by mouth daily  , Disp: , Rfl:   •  ZOLMitriptan (ZOMIG) 5 MG tablet, Take 5 mg by mouth once as needed for migraine May repeat in 2 hours if unresolved  Do not exceed 10 mg in 24 hours  (Patient not taking: Reported on 2/6/2023), Disp: , Rfl:   •  zolpidem (AMBIEN CR) 12 5 MG CR tablet, Take 10 mg by mouth daily at bedtime as needed for sleep   (Patient not taking: Reported on 10/5/2022), Disp: , Rfl:     Current Facility-Administered Medications:   •  bupivacaine (PF) (MARCAINE) 0 25 % injection 6 mL, 6 mL, Intra-articular, Once, Will Yuliana Elliott MD  •  iohexol (OMNIPAQUE) 300 mg/mL injection 1 mL, 1 mL, Intra-articular, Once, Will Yuliana Elliott MD  •  lidocaine (PF) (XYLOCAINE-MPF) 1 % injection 2 mL, 2 mL, Infiltration, Once, Will Yuliana Elliott MD  •  methylPREDNISolone acetate (DEPO-MEDROL) injection 80 mg, 80 mg, Intra-articular, Once, Will Yuliana Elliott MD    Allergies   Allergen Reactions   • Pollen Extract        Physical Exam:   Vitals:    03/23/23 0908   BP: 109/80   Pulse: 70   Resp: 18   Temp: (!) 97 4 °F (36 3 °C)   SpO2: 93%     General: Awake, Alert, Oriented x 3, Mood and affect appropriate  Respiratory: Respirations even and unlabored  Cardiovascular: Peripheral pulses intact; no edema  Musculoskeletal Exam: tenderness over bilateral SIJ    ASA Score: 2    Patient/Chart Verification  Patient ID Verified: Verbal  Consents Confirmed: Procedural, To be obtained in the Pre-Procedure area  H&P( within 30 days) Verified: To be obtained in the Pre-Procedure area  Allergies Reviewed: Yes  Anticoag/NSAID held?: NA  Currently on antibiotics?: No  Pregnancy denied?: Yes    Assessment:   1   Sacroiliitis ([facility] [address] )        Plan: GAYLE SIJ injection w/ fluoroscopy

## 2023-03-23 NOTE — DISCHARGE INSTRUCTIONS
Steroid Joint Injection   WHAT YOU NEED TO KNOW:   A steroid joint injection is a procedure to inject steroid medicine into a joint  Steroid medicine decreases pain and inflammation  The injection may also contain an anesthetic (numbing medicine) to decrease pain  It may be done to treat conditions such as arthritis, gout, or carpal tunnel syndrome  The injections may be given in your knee, ankle, shoulder, elbow, wrist, ankle or sacroiliac joint  Do not apply heat to any area that is numb  If you have discomfort or soreness at the injection site, you may apply ice today, 20 minutes on and 20 minutes off  Tomorrow you may use ice or warm, moist heat  Do not apply ice or heat directly to the skin  You may have an increase or change in the discomfort for 36-48 hours after your treatment  Apply ice and continue with any pain medicine you have been prescribed  Do not do anything strenuous today  You may shower, but no tub baths or hot tubs today  You may resume your normal activities tomorrow, but do not “overdo it”  Resume normal activities slowly when you are feeling better  If you experience redness, drainage or swelling at the injection site, or if you develop a fever above 100 degrees, please call [facility] at [phone] or go to the Emergency Room  Continue to take all routine medicines prescribed by your primary care physician unless otherwise instructed by our staff  Most blood thinners should be started again according to your regularly scheduled dosing  If you have any questions, please give our office a call  As no general anesthesia was used in today's procedure, you should not experience any side effects related to anesthesia  If you are diabetic, the steroids used in today's injection may temporarily increase your blood sugar levels after the first few days after your injection   Please keep a close eye on your sugars and alert the doctor who manages your diabetes if your sugars are significantly high from your baseline or you are symptomatic  If you have a problem specifically related to your procedure, please call our office at [phone]  Problems not related to your procedure should be directed to your primary care physician

## 2023-03-30 ENCOUNTER — TELEPHONE (OUTPATIENT)
Dept: PAIN MEDICINE | Facility: CLINIC | Age: 48
End: 2023-03-30

## 2023-03-30 NOTE — TELEPHONE ENCOUNTER
Caller: Manoj Sequeira  Doctor/office: [facility]#: [phone]    % of improvement: 65%  Pain Scale (1-10): 3/10

## 2023-04-05 ENCOUNTER — TELEPHONE (OUTPATIENT)
Dept: PAIN MEDICINE | Facility: MEDICAL CENTER | Age: 48
End: 2023-04-05

## 2023-04-05 DIAGNOSIS — G89.4 CHRONIC PAIN SYNDROME: ICD-10-CM

## 2023-04-05 RX ORDER — PREGABALIN 200 MG/1
200 CAPSULE ORAL 2 TIMES DAILY
Qty: 60 CAPSULE | Refills: 2 | Status: SHIPPED | OUTPATIENT
Start: 2023-04-05

## 2023-04-05 NOTE — TELEPHONE ENCOUNTER
Caller: Omkar Rodriguez     Doctor/Office: Dr Singleton Rater    Call regarding :  RN    Call was transferred to: RN

## 2023-04-05 NOTE — TELEPHONE ENCOUNTER
Pt contacted Call Center requested refill of their medication          Medication Name: lyrica    Dosage of Med: 200 mg       Frequency of Med: 1 tab BID       [facility] [place]

## 2023-06-16 ENCOUNTER — APPOINTMENT (OUTPATIENT)
Dept: RADIOLOGY | Age: 48
End: 2023-06-16
Payer: MEDICARE

## 2023-06-16 ENCOUNTER — OFFICE VISIT (OUTPATIENT)
Dept: URGENT CARE | Age: 48
End: 2023-06-16
Payer: MEDICARE

## 2023-06-16 VITALS
HEIGHT: 67 IN | RESPIRATION RATE: 17 BRPM | DIASTOLIC BLOOD PRESSURE: 86 MMHG | HEART RATE: 76 BPM | BODY MASS INDEX: 29.82 KG/M2 | TEMPERATURE: 97.2 F | OXYGEN SATURATION: 96 % | SYSTOLIC BLOOD PRESSURE: 133 MMHG | WEIGHT: 190 LBS

## 2023-06-16 DIAGNOSIS — M79.672 LEFT FOOT PAIN: ICD-10-CM

## 2023-06-16 DIAGNOSIS — M79.672 LEFT FOOT PAIN: Primary | ICD-10-CM

## 2023-06-16 PROCEDURE — 99213 OFFICE O/P EST LOW 20 MIN: CPT

## 2023-06-16 PROCEDURE — G0463 HOSPITAL OUTPT CLINIC VISIT: HCPCS

## 2023-06-16 PROCEDURE — 73630 X-RAY EXAM OF FOOT: CPT

## 2023-06-16 NOTE — PROGRESS NOTES
[address]        NAME: Brittanie Painter is a 46 y o  female  : 1975    MRN: [id number]  DATE: 2023  TIME: 1:21 PM    Assessment and Plan   Left foot pain [M79 [id number]]  1  Left foot pain  XR foot 3+ vw left            Patient Instructions       Follow up with PCP in 3-5 days  Proceed to  ER if symptoms worsen  Chief Complaint     Chief Complaint   Patient presents with   • Foot Pain     Having burning/tingling in b/l heel into feet for over 1 mo  Hx of SI joint issue  Tried Motrin/Aleve takes edge off  +ROM  Sensation worse when over ambulate  No edema/bruising  History of Present Illness       Patient presents for eval of month long left foot burning, tingling and heel pain that she notes is interfering with her job  Has appt with podiatry  Called pain management regarding and was advised they are seeing her for her back and to get further eval  No injury, trauma  Pt requesting xray   Discussed that xrays note broken bones and will not be a good diagnostic measure of muscle strains or neuropathic pain  Review of Systems   Review of Systems   Constitutional: Negative for activity change and appetite change  Musculoskeletal: Negative for gait problem  Left foot heel pain, numbness/tingling  All other systems reviewed and are negative  Current Medications       Current Outpatient Medications:   •  buPROPion (WELLBUTRIN SR) 100 mg 12 hr tablet, , Disp: , Rfl:   •  busPIRone (BUSPAR) 15 mg tablet, , Disp: , Rfl:   •  clonazePAM (KlonoPIN) 0 5 mg tablet, TAKE 0 5-1 TAB BY MOUTH TWICE A DAY AS NEEDED FOR ANXIETY, Disp: , Rfl:   •  dicyclomine (BENTYL) 20 mg tablet, Take 1 tablet (20 mg total) by mouth 2 (two) times a day, Disp: 20 tablet, Rfl: 0  •  flecainide (TAMBOCOR) 100 mg tablet, , Disp: , Rfl:   •  levothyroxine 100 mcg tablet, Take 112 mcg by mouth daily  , Disp: , Rfl:   •  Linzess 72 MCG CAPS, Take 1 capsule by mouth daily, Disp: , Rfl:   •  lithium 300 MG tablet, , Disp: , Rfl:   •  ondansetron (ZOFRAN) 4 mg tablet, Take 1 tablet (4 mg total) by mouth every 6 (six) hours, Disp: 12 tablet, Rfl: 0  •  pantoprazole (PROTONIX) 40 mg tablet, Take 1 tablet by mouth daily, Disp: , Rfl:   •  pregabalin (LYRICA) 200 MG capsule, Take 1 capsule (200 mg total) by mouth 2 (two) times a day, Disp: 60 capsule, Rfl: 2  •  venlafaxine (EFFEXOR-XR) 150 mg 24 hr capsule, Take 150 mg by mouth daily  , Disp: , Rfl:   •  venlafaxine (EFFEXOR-XR) 37 5 mg 24 hr capsule, Take 37 5 mg by mouth daily  , Disp: , Rfl:   •  albuterol (VENTOLIN HFA) 90 mcg/act inhaler, Inhale 2 puffs every 6 (six) hours as needed for wheezing (Patient not taking: Reported on 9/28/2020), Disp: 18 g, Rfl: 0  •  ARIPiprazole (ABILIFY) 5 mg tablet, Take 5 mg by mouth daily, Disp: , Rfl:   •  asenapine (SAPHRIS) 5 mg SL tablet, Place 5 mg under the tongue daily at bedtime  (Patient not taking: Reported on 10/5/2022), Disp: , Rfl:   •  baclofen 10 mg tablet, Take 1 tablet (10 mg total) by mouth 3 (three) times a day, Disp: 90 tablet, Rfl: 2  •  benzonatate (TESSALON) 200 MG capsule, Take 1 capsule (200 mg total) by mouth 3 (three) times a day as needed for cough (Patient not taking: Reported on 6/16/2023), Disp: 20 capsule, Rfl: 0  •  brompheniramine-pseudoephedrine-DM 30-2-10 MG/5ML syrup, Take 10 mL by mouth 4 (four) times a day as needed for congestion or cough (Patient not taking: Reported on 10/10/2022), Disp: 120 mL, Rfl: 0  •  buPROPion (WELLBUTRIN SR) 100 mg 12 hr tablet, Take 100 mg by mouth daily (Patient not taking: Reported on 6/16/2023), Disp: , Rfl:   •  cycloSPORINE (RESTASIS) 0 05 % ophthalmic emulsion, Administer 1 drop to both eyes 2 (two) times a day   (Patient not taking: Reported on 2/6/2023), Disp: , Rfl:   •  dicyclomine (BENTYL) 20 mg tablet, Take 20 mg by mouth 3 (three) times a day (Patient not taking: Reported on 2/6/2023), Disp: , Rfl:   •  flecainide (TAMBOCOR) 50 mg tablet, 150 mg  (Patient not taking: Reported on 6/16/2023), Disp: , Rfl:   •  hydrOXYzine HCL (ATARAX) 25 mg tablet, Take 25-50 mg by mouth every 6 (six) hours as needed (Patient not taking: Reported on 10/10/2022), Disp: , Rfl:   •  hyoscyamine (LEVSIN/SL) 0 125 mg SL tablet, Take 1 tablet (0 125 mg total) by mouth every 6 (six) hours as needed for cramping (Patient not taking: Reported on 3/20/2023), Disp: 60 tablet, Rfl: 3  •  levothyroxine 137 mcg tablet, , Disp: , Rfl:   •  metoprolol succinate (TOPROL-XL) 25 mg 24 hr tablet, Take 25 mg by mouth 2 (two) times a day, Disp: , Rfl:   •  topiramate (TOPAMAX) 100 mg tablet, Take 100 mg by mouth 2 (two) times a day  (Patient not taking: Reported on 10/5/2022), Disp: , Rfl:   •  ZOLMitriptan (ZOMIG) 5 MG tablet, Take 5 mg by mouth once as needed for migraine May repeat in 2 hours if unresolved  Do not exceed 10 mg in 24 hours  (Patient not taking: Reported on 2/6/2023), Disp: , Rfl:   •  zolpidem (AMBIEN CR) 12 5 MG CR tablet, Take 10 mg by mouth daily at bedtime as needed for sleep  (Patient not taking: Reported on 10/5/2022), Disp: , Rfl:     Current Allergies     Allergies as of 06/16/2023 - Reviewed 06/16/2023   Allergen Reaction Noted   • Pollen extract  08/16/2017            The following portions of the patient's history were reviewed and updated as appropriate: allergies, current medications, past family history, past medical history, past social history, past surgical history and problem list      Past Medical History:   Diagnosis Date   • Anxiety    • Bipolar 1 disorder ([address] )    • Depression    • Disease of thyroid gland    • Fibromyalgia, primary    • GERD (gastroesophageal reflux disease) 2015   • Inflammatory bowel disease    • Thyroid disease        Past Surgical History:   Procedure Laterality Date   • APPENDECTOMY     • EPIDURAL BLOCK INJECTION  2017?    • KNEE SURGERY     • ORTHOPEDIC SURGERY  2008   • TOTAL THYROIDECTOMY         Family History   Problem Relation Age of Onset "  • Cancer Mother    • Depression Mother    • Diabetes Mother    • GI problems Mother    • Hypertension Mother    • Mental illness Mother    • Migraines Mother    • No Known Problems Father          Medications have been verified  Objective   /86   Pulse 76   Temp (!) 97 2 °F (36 2 °C)   Resp 17   Ht 5' 7\" (1 702 m)   Wt 86 2 kg (190 lb)   SpO2 96%   BMI 29 76 kg/m²   No LMP recorded  Patient has had an ablation  Physical Exam     Physical Exam  Vitals reviewed  Constitutional:       Appearance: Normal appearance  Musculoskeletal:         General: No swelling or signs of injury  Skin:     Capillary Refill: Capillary refill takes less than 2 seconds  Findings: No bruising or erythema  Neurological:      General: No focal deficit present  Mental Status: She is alert                     "

## 2023-06-23 ENCOUNTER — OFFICE VISIT (OUTPATIENT)
Dept: PAIN MEDICINE | Facility: CLINIC | Age: 48
End: 2023-06-23
Payer: MEDICARE

## 2023-06-23 VITALS
WEIGHT: 190 LBS | HEIGHT: 67 IN | BODY MASS INDEX: 29.82 KG/M2 | SYSTOLIC BLOOD PRESSURE: 130 MMHG | DIASTOLIC BLOOD PRESSURE: 70 MMHG

## 2023-06-23 DIAGNOSIS — M46.1 SACROILIITIS, NOT ELSEWHERE CLASSIFIED (HCC): Primary | ICD-10-CM

## 2023-06-23 DIAGNOSIS — M79.671 BILATERAL FOOT PAIN: ICD-10-CM

## 2023-06-23 DIAGNOSIS — M79.672 BILATERAL FOOT PAIN: ICD-10-CM

## 2023-06-23 DIAGNOSIS — M47.816 LUMBAR SPONDYLOSIS: ICD-10-CM

## 2023-06-23 PROCEDURE — 99214 OFFICE O/P EST MOD 30 MIN: CPT | Performed by: ANESTHESIOLOGY

## 2023-06-23 RX ORDER — VENLAFAXINE HYDROCHLORIDE 75 MG/1
CAPSULE, EXTENDED RELEASE ORAL
COMMUNITY
Start: 2023-06-22

## 2023-06-23 RX ORDER — MELOXICAM 7.5 MG/1
7.5 TABLET ORAL DAILY
Qty: 30 TABLET | Refills: 0 | Status: SHIPPED | OUTPATIENT
Start: 2023-06-23 | End: 2023-06-26 | Stop reason: SDUPTHER

## 2023-06-23 NOTE — PROGRESS NOTES
Assessment:  1  Sacroiliitis, not elsewhere classified ([facility] [address] )    2  Lumbar spondylosis    3  Bilateral foot pain        Plan:    While the patient was in the office today, I did have a thorough conversation regarding their chronic pain syndrome, medication management, and treatment plan options      The patient underwent bilateral sacroiliac joint injections on 3/23/2023 and she is able to report overall greater than 50% improvement in her pain symptoms lasting greater than 3 months and ongoing  She was made aware that we can repeat injections if need be in the future      Continue pregabalin 200 mg twice daily which she has found effective        Patient endorses bilateral left greater than right isolated foot and ankle pains occasionally involving the calves  She does not describe this as a radicular quality as the worst symptoms are isolated in her feet and ankles  We reviewed her lumbar MRI which did not show any compromise of the central canal or neural foramen at any level  He does have disc degeneration at L5-S1 but I discussed that this would not cause any significant radiating pains into the legs let alone isolated pains in the ankles and feet  She requested referral to podiatry to further evaluate her bilateral foot symptoms  Given her increased pain I will start her on meloxicam 7 5 mg daily for 1 month  Patient was informed to obtain her lithium levels approximately 1 week after starting meloxicam to ensure that she does not develop any increased lithium burden in her system  Patient will follow-up in 2 months for reassessment  [place] Prescription Drug Monitoring Program report was reviewed and was appropriate     My impressions and treatment recommendations were discussed in detail with the patient who verbalized understanding and had no further questions  Discharge instructions were provided   I personally saw and examined the patient and I agree with the above discussed plan of care     Orders Placed This Encounter   Procedures   • Ambulatory Referral to Podiatry     Standing Status:   Future     Standing Expiration Date:   6/23/2024     Referral Priority:   Routine     Referral Type:   Consult - AMB     Referral Reason:   Specialty Services Required     Requested Specialty:   Podiatry     Number of Visits Requested:   1     Expiration Date:   6/23/2024     New Medications Ordered This Visit   Medications   • venlafaxine (EFFEXOR-XR) 75 mg 24 hr capsule   • meloxicam (MOBIC) 7 5 mg tablet     Sig: Take 1 tablet (7 5 mg total) by mouth daily     Dispense:  30 tablet     Refill:  0       History of Present Illness:  Pee Mir is a 46 y o  female who presents for a follow up office visit in regards to Back Pain (Lower back)  The patient’s current symptoms include low back pain and bilateral foot and ankle pains  Symptoms have worsened since her last office visit  Pain is currently rated 6 out of 10 on the pain scale  Patient describes the pain as a burning, cramping, shooting pain with numbness and pins-and-needles worse at night  Patient underwent bilateral SI joint injections on 3/23/2023 with greater than 50% relief of her lower back pain symptoms for greater than 3 months and ongoing  Her main complaint today is her bilateral foot and ankle pains, left greater than right  She denies any significant radicular component down her bilateral lower extremities  Patient currently continues on a medication regimen consisting of pregabalin 200 mg twice daily with benefit  I have personally reviewed and/or updated the patient's past medical history, past surgical history, family history, social history, current medications, allergies, and vital signs today  Review of Systems   Musculoskeletal: Positive for back pain and gait problem  Neurological: Positive for weakness (in legs) and numbness         Patient Active Problem List   Diagnosis   • Bipolar 1 disorder ([facility] [address] ) • Thyroid disease   • Anxiety   • Chronic pain syndrome   • Lumbar radiculopathy   • Foraminal stenosis of lumbar region   • Sacroiliitis [facility])       Past Medical History:   Diagnosis Date   • Anxiety    • Bipolar 1 disorder ([facility] [address] )    • Depression    • Disease of thyroid gland    • Fibromyalgia, primary    • GERD (gastroesophageal reflux disease) 2015   • Inflammatory bowel disease    • Thyroid disease        Past Surgical History:   Procedure Laterality Date   • APPENDECTOMY     • EPIDURAL BLOCK INJECTION  2017?    • KNEE SURGERY     • ORTHOPEDIC SURGERY  2008   • TOTAL THYROIDECTOMY         Family History   Problem Relation Age of Onset   • Cancer Mother    • Depression Mother    • Diabetes Mother    • GI problems Mother    • Hypertension Mother    • Mental illness Mother    • Migraines Mother    • No Known Problems Father        Social History     Occupational History   • Not on file   Tobacco Use   • Smoking status: Every Day     Packs/day: 0 50     Years: 20 00     Total pack years: 10 00     Types: Cigarettes   • Smokeless tobacco: Never   Vaping Use   • Vaping Use: Never used   Substance and Sexual Activity   • Alcohol use: Yes     Comment: socially    • Drug use: No   • Sexual activity: Yes     Partners: Male     Birth control/protection: None       Current Outpatient Medications on File Prior to Visit   Medication Sig   • albuterol (VENTOLIN HFA) 90 mcg/act inhaler Inhale 2 puffs every 6 (six) hours as needed for wheezing   • buPROPion (WELLBUTRIN SR) 100 mg 12 hr tablet    • busPIRone (BUSPAR) 15 mg tablet    • clonazePAM (KlonoPIN) 0 5 mg tablet TAKE 0 5-1 TAB BY MOUTH TWICE A DAY AS NEEDED FOR ANXIETY   • dicyclomine (BENTYL) 20 mg tablet Take 1 tablet (20 mg total) by mouth 2 (two) times a day   • flecainide (TAMBOCOR) 100 mg tablet    • ondansetron (ZOFRAN) 4 mg tablet Take 1 tablet (4 mg total) by mouth every 6 (six) hours   • pantoprazole (PROTONIX) 40 mg tablet Take 1 tablet by mouth daily   • pregabalin (LYRICA) 200 MG capsule Take 1 capsule (200 mg total) by mouth 2 (two) times a day   • topiramate (TOPAMAX) 100 mg tablet Take 100 mg by mouth 2 (two) times a day   • venlafaxine (EFFEXOR-XR) 75 mg 24 hr capsule    • ZOLMitriptan (ZOMIG) 5 MG tablet Take 5 mg by mouth once as needed for migraine May repeat in 2 hours if unresolved  Do not exceed 10 mg in 24 hours  • ARIPiprazole (ABILIFY) 5 mg tablet Take 5 mg by mouth daily   • asenapine (SAPHRIS) 5 mg SL tablet Place 5 mg under the tongue daily at bedtime (Patient not taking: Reported on 6/23/2023)   • baclofen 10 mg tablet Take 1 tablet (10 mg total) by mouth 3 (three) times a day (Patient not taking: Reported on 6/23/2023)   • benzonatate (TESSALON) 200 MG capsule Take 1 capsule (200 mg total) by mouth 3 (three) times a day as needed for cough (Patient not taking: Reported on 6/16/2023)   • brompheniramine-pseudoephedrine-DM 30-2-10 MG/5ML syrup Take 10 mL by mouth 4 (four) times a day as needed for congestion or cough (Patient not taking: Reported on 10/10/2022)   • buPROPion [facility]) 100 mg 12 hr tablet Take 100 mg by mouth daily (Patient not taking: Reported on 6/16/2023)   • cycloSPORINE (RESTASIS) 0 05 % ophthalmic emulsion Administer 1 drop to both eyes 2 (two) times a day  (Patient not taking: Reported on 2/6/2023)   • dicyclomine (BENTYL) 20 mg tablet Take 20 mg by mouth 3 (three) times a day (Patient not taking: Reported on 2/6/2023)   • flecainide (TAMBOCOR) 50 mg tablet 150 mg  (Patient not taking: Reported on 6/16/2023)   • hydrOXYzine HCL (ATARAX) 25 mg tablet Take 25-50 mg by mouth every 6 (six) hours as needed (Patient not taking: Reported on 10/10/2022)   • hyoscyamine (LEVSIN/SL) 0 125 mg SL tablet Take 1 tablet (0 125 mg total) by mouth every 6 (six) hours as needed for cramping (Patient not taking: Reported on 3/20/2023)   • levothyroxine 100 mcg tablet Take 112 mcg by mouth daily   (Patient not taking: Reported on "6/23/2023)   • levothyroxine 137 mcg tablet  (Patient not taking: Reported on 10/5/2022)   • Linzess 72 MCG CAPS Take 1 capsule by mouth daily   • lithium 300 MG tablet    • metoprolol succinate (TOPROL-XL) 25 mg 24 hr tablet Take 25 mg by mouth 2 (two) times a day   • venlafaxine (EFFEXOR-XR) 150 mg 24 hr capsule Take 150 mg by mouth daily  (Patient not taking: Reported on 6/23/2023)   • venlafaxine (EFFEXOR-XR) 37 5 mg 24 hr capsule Take 37 5 mg by mouth daily  (Patient not taking: Reported on 6/23/2023)   • zolpidem (AMBIEN CR) 12 5 MG CR tablet Take 10 mg by mouth daily at bedtime as needed for sleep  (Patient not taking: Reported on 10/5/2022)     No current facility-administered medications on file prior to visit  Allergies   Allergen Reactions   • Pollen Extract        Physical Exam:    /70   Ht 5' 7\" (1 702 m)   Wt 86 2 kg (190 lb)   BMI 29 76 kg/m²     Constitutional:normal, well developed, well nourished, alert, in no distress and non-toxic and no overt pain behavior  Eyes:anicteric  HEENT:grossly intact  Neck:supple, symmetric, trachea midline and no masses   Pulmonary:even and unlabored  Cardiovascular:No edema or pitting edema present  Skin:Normal without rashes or lesions and well hydrated  Psychiatric:Mood and affect appropriate  Neurologic: Motor function is grossly intact with no focal neurologic deficit  Musculoskeletal: Tenderness in the bilateral lumbar paraspinal muscles and bilateral sacroiliac regions       Imaging    "

## 2023-06-26 ENCOUNTER — OFFICE VISIT (OUTPATIENT)
Dept: PODIATRY | Facility: CLINIC | Age: 48
End: 2023-06-26
Payer: MEDICARE

## 2023-06-26 VITALS
HEIGHT: 67 IN | SYSTOLIC BLOOD PRESSURE: 130 MMHG | DIASTOLIC BLOOD PRESSURE: 70 MMHG | WEIGHT: 190 LBS | BODY MASS INDEX: 29.82 KG/M2

## 2023-06-26 DIAGNOSIS — M72.2 PLANTAR FASCIITIS, BILATERAL: ICD-10-CM

## 2023-06-26 DIAGNOSIS — M47.816 LUMBAR SPONDYLOSIS: ICD-10-CM

## 2023-06-26 DIAGNOSIS — M77.50 ENTHESOPATHY OF ANKLE: Primary | ICD-10-CM

## 2023-06-26 PROCEDURE — 99203 OFFICE O/P NEW LOW 30 MIN: CPT | Performed by: PODIATRIST

## 2023-06-26 RX ORDER — MELOXICAM 7.5 MG/1
7.5 TABLET ORAL DAILY
Qty: 30 TABLET | Refills: 3 | Status: SHIPPED | OUTPATIENT
Start: 2023-06-26

## 2023-06-26 NOTE — PATIENT INSTRUCTIONS
Plantar Fasciitis Exercises   WHAT YOU NEED TO KNOW:   Plantar fasciitis exercises help stretch your plantar fascia, calf muscles, and Achilles tendon  They also help strengthen the muscles that support your heel and foot  Exercises and stretching can help prevent plantar fasciitis from getting worse or coming back  DISCHARGE INSTRUCTIONS:   Call your doctor or physical therapist if:   Your pain and swelling increase  You develop new knee, hip, or back pain  You have questions or concerns about your condition or care  How to do plantar fasciitis exercises:  Ask your healthcare provider when to start these exercises and how often to do them  Slant board stretch:  Stand on a slanted board with your toes higher than your heel  Press your heel into the board  Keep your knee slightly bent  Hold this position for 1 minute  Repeat 5 times  Heel stretch:  Stand up straight with your hands on a wall  Place your injured leg slightly behind your other leg  Keep your heels flat on the floor, lean forward, and bend both knees  Hold for 30 seconds  Calf stretch:  Stand up straight with your hands on a wall  Step forward so that your uninjured foot is in front of your injured foot  Keep your front leg bent and your back leg straight  Gently lean forward until you feel your calf stretch  Hold for 30 seconds and then relax  Seated plantar fascia stretch:  Sit on a firm surface, such as the floor or a mat  Extend your legs out in front of you  Raise your injured foot a few inches off the ground  Keep your leg straight  Grab the toes of your injured foot and pull them toward you  With your other hand, feel your plantar fascia  You should feel it press outward  Hold for 30 seconds  If you cannot reach your toes, loop a towel or tie around your foot  Gently pull on the towel or tie and flex your toes toward you  Heel raises:  Stand on the injured leg  Raise your other leg off the ground  Hold onto a railing or wall for balance  Slowly rise up on the toes of your injured leg  Hold for 5 seconds  Slowly lower your heel to the ground  Toe curls:  Place a towel on the floor  Put your foot flat on the towel  Grab the towel with your toes by curling them around the towel  Lift the towel up with your toes  Toe taps:  Sit down and place your foot flat on the floor  Keep your heel on the floor  Point all your toes up toward the ceiling  While the 4 smaller toes are pointed up, bend your big toe down and tap it on the ground  Do 10 to 50 taps  Point all 5 toes up toward the ceiling again  This time keep your big toe pointed up and tap the 4 smaller toes on the ground  Do 10 to 50 taps each time  Follow up with your doctor or physical therapist as directed:  Write down your questions so you remember to ask them during your visits  © Copyright [employer] 2022 Information is for End User's use only and may not be sold, redistributed or otherwise used for commercial purposes  The above information is an  only  It is not intended as medical advice for individual conditions or treatments  Talk to your doctor, nurse or pharmacist before following any medical regimen to see if it is safe and effective for you

## 2023-06-26 NOTE — PROGRESS NOTES
"Assessment/Plan:       Diagnoses and all orders for this visit:    Enthesopathy of ankle  -     Ambulatory referral to Physical Therapy; Future    Plantar fasciitis, bilateral  -     Ambulatory Referral to Podiatry  -     Ambulatory referral to Physical Therapy; Future    Lumbar spondylosis  -     meloxicam (MOBIC) 7 5 mg tablet; Take 1 tablet (7 5 mg total) by mouth daily      Diagnosis and options discussed with patient  Patient agreeable to the plan as stated below    Need therapy for the achilles enthesopathy and plantar fascitis, stressed compliance 3x per day  Mobic daily is fine    Her acute ankle issues do not seem to correlate to her lumbar issues  With some formal PT this acute issue should resolve    1  Discussed diagnosis and treatment options  2  Provided home therapy and stretching exercises  3  Educated eccentric exercises for the achilles enthesopathy  4  Stressed compliance with home/formal PT  5  Purchase Tuli heel cups, Voltaren gel         Subjective:      Patient ID: Tejas Crenshaw is a 46 y o  female  Patient has been getting pins and needles on her ankles extending into the arches  Left is worse than right  It began a few months ago and getting worse  She did get an Xray  She has h/o lumbar radiculopathy      The following portions of the patient's history were reviewed and updated as appropriate: allergies, current medications, past family history, past medical history, past social history, past surgical history and problem list     Review of Systems    Constitutional: Negative  Respiratory: Negative for cough and shortness of breath  Gastrointestinal: Negative for diarrhea, nausea and vomiting  Musculoskeletal: ankle pain  Skin: Negative for rash or wound  Neurological: Negative for weakness, numbness and headaches  Objective:      /70   Ht 5' 7\" (1 702 m)   Wt 86 2 kg (190 lb)   BMI 29 76 kg/m²          Physical Exam  Vitals reviewed     Cardiovascular:    " Rate and Rhythm: Normal rate  Pulses: Normal pulses  Pulmonary:      Effort: Pulmonary effort is normal  No respiratory distress  Musculoskeletal:         General: Tenderness (insertional achilles tendonitis L>R  Plantar fascia insertional tenderness) present  No deformity (no signfificant equinus, normal STJ and ankle ROM)  Skin:     Capillary Refill: Capillary refill takes less than 2 seconds  Findings: No erythema  Neurological:      Mental Status: She is alert and oriented to person, place, and time  Sensory: No sensory deficit         XRay 3 views of the left foot personally read by Dr Uriel Osorio in office today and discussed with patient:  No significant spurring  No acute findings

## 2023-07-05 ENCOUNTER — TELEPHONE (OUTPATIENT)
Dept: PAIN MEDICINE | Facility: CLINIC | Age: 48
End: 2023-07-05

## 2023-07-05 DIAGNOSIS — G89.4 CHRONIC PAIN SYNDROME: ICD-10-CM

## 2023-07-05 RX ORDER — PREGABALIN 200 MG/1
200 CAPSULE ORAL 2 TIMES DAILY
Qty: 60 CAPSULE | Refills: 2 | Status: SHIPPED | OUTPATIENT
Start: 2023-07-05

## 2023-07-31 ENCOUNTER — TELEPHONE (OUTPATIENT)
Dept: PAIN MEDICINE | Facility: CLINIC | Age: 48
End: 2023-07-31

## 2023-07-31 DIAGNOSIS — M79.18 MYOFASCIAL PAIN SYNDROME: ICD-10-CM

## 2023-07-31 RX ORDER — BACLOFEN 10 MG/1
10 TABLET ORAL 3 TIMES DAILY
Qty: 90 TABLET | Refills: 2 | Status: SHIPPED | OUTPATIENT
Start: 2023-07-31

## 2023-07-31 NOTE — TELEPHONE ENCOUNTER
Pt contacted Call Center requested refill of their medication. Medication Name: baclofen 10 mg tablet             Frequency of Med: once a day       Remaining Medication: 3      Pharmacy and Location: [address], [address] [place] [address]   Phone:  [phone]  Fax:  [phone]   APRIL #:         Pt. Preferred Callback Phone [facility]      Thank you.

## 2023-08-25 ENCOUNTER — OFFICE VISIT (OUTPATIENT)
Dept: PAIN MEDICINE | Facility: CLINIC | Age: 48
End: 2023-08-25
Payer: MEDICARE

## 2023-08-25 VITALS
BODY MASS INDEX: 29.82 KG/M2 | WEIGHT: 190 LBS | DIASTOLIC BLOOD PRESSURE: 78 MMHG | SYSTOLIC BLOOD PRESSURE: 118 MMHG | HEIGHT: 67 IN

## 2023-08-25 DIAGNOSIS — M46.1 SACROILIITIS, NOT ELSEWHERE CLASSIFIED (HCC): Primary | ICD-10-CM

## 2023-08-25 DIAGNOSIS — M47.816 LUMBAR SPONDYLOSIS: ICD-10-CM

## 2023-08-25 PROCEDURE — 99214 OFFICE O/P EST MOD 30 MIN: CPT | Performed by: ANESTHESIOLOGY

## 2023-08-25 NOTE — PROGRESS NOTES
Assessment:  1. Sacroiliitis, not elsewhere classified ([address])    2. Lumbar spondylosis        Plan:    While the patient was in the office today, I did have a thorough conversation regarding their chronic pain syndrome, medication management, and treatment plan options.     The patient underwent bilateral sacroiliac joint injections on 3/23/2023 and she is able to report that she had overall greater than 50% improvement in her pain symptoms lasting greater than 3 months.    - Her sacroiliac joint pain is returning more so on the right side and is becoming more limiting in regards to her activities of daily living. At this time we will schedule her for repeat right sacroiliac joint injection. Patient does fulfill the criteria for sacroiliac joint injection at this time With the following:  -Moderate to severe low back pain over the anatomical location of the right sacroiliac joint below L5,  -Pain duration of at least 3 months,  -No untreated radiculopathy,  -3 positive provocative maneuvers noted on exam: Ade Guard, compression, distraction test  -Inadequate response to conservative methods for 4 weeks    The SI joint injection will be performed under fluoroscopic guidance, and subsequent therapeutic sacroiliac joint injections will be done if the diagnostic injections provide at least 75% sustained relief for the duration of the local anesthetic or anti-inflammatory .     - We will continue with her pregabalin 200 mg twice daily and baclofen 10 mg 3 times daily regimen.     Patient will follow-up after repeat right SI joint injection for reassessment.     [place] Prescription Drug Monitoring Program report was reviewed and was appropriate      My impressions and treatment recommendations were discussed in detail with the patient who verbalized understanding and had no further questions. Discharge instructions were provided.  I personally saw and examined the patient and I agree with the above discussed plan of care.       Orders Placed This Encounter   Procedures   • FL spine and pain procedure     Standing Status:   Future     Standing Expiration Date:   8/25/2027     Order Specific Question:   Reason for Exam:     Answer:   right SIJ injection     Order Specific Question:   Is the patient pregnant? Answer:   No     Order Specific Question:   Anticoagulant hold needed? Answer:   no     No orders of the defined types were placed in this encounter. History of Present Illness:  Radha Vila is a 46 y.o. female who presents for a follow up office visit in regards to Back Pain (F/U). The patient’s current symptoms include worsening right sided lower back pain. Pain is rated a 5 out of 10 and described as an intermittent burning, dull/aching, shooting pain worse in the evening. I have personally reviewed and/or updated the patient's past medical history, past surgical history, family history, social history, current medications, allergies, and vital signs today. Review of Systems   Constitutional: Negative for chills and fever. HENT: Negative for ear pain and sore throat. Eyes: Negative for pain and visual disturbance. Respiratory: Negative for cough and shortness of breath. Cardiovascular: Negative for chest pain and palpitations. Gastrointestinal: Negative for abdominal pain and vomiting. Genitourinary: Negative for dysuria and hematuria. Musculoskeletal: Positive for arthralgias, back pain and gait problem. Skin: Negative for color change and rash. Neurological: Negative for seizures and syncope. All other systems reviewed and are negative.       Patient Active Problem List   Diagnosis   • Bipolar 1 disorder ([address])   • Thyroid disease   • Anxiety   • Chronic pain syndrome   • Lumbar radiculopathy   • Foraminal stenosis of lumbar region   • Sacroiliitis [facility])       Past Medical History:   Diagnosis Date   • Anxiety    • Bipolar 1 disorder ([address])    • Depression    • Disease of thyroid gland    • Fibromyalgia, primary    • GERD (gastroesophageal reflux disease) 2015   • Inflammatory bowel disease    • Thyroid disease        Past Surgical History:   Procedure Laterality Date   • APPENDECTOMY     • EPIDURAL BLOCK INJECTION  2017?    • KNEE SURGERY     • ORTHOPEDIC SURGERY  2008   • TOTAL THYROIDECTOMY         Family History   Problem Relation Age of Onset   • Cancer Mother    • Depression Mother    • Diabetes Mother    • GI problems Mother    • Hypertension Mother    • Mental illness Mother    • Migraines Mother    • No Known Problems Father        Social History     Occupational History   • Not on file   Tobacco Use   • Smoking status: Every Day     Packs/day: 0.50     Years: 20.00     Total pack years: 10.00     Types: Cigarettes   • Smokeless tobacco: Never   Vaping Use   • Vaping Use: Never used   Substance and Sexual Activity   • Alcohol use: Yes     Comment: socially    • Drug use: No   • Sexual activity: Yes     Partners: Male     Birth control/protection: None       Current Outpatient Medications on File Prior to Visit   Medication Sig   • albuterol (VENTOLIN HFA) 90 mcg/act inhaler Inhale 2 puffs every 6 (six) hours as needed for wheezing   • baclofen 10 mg tablet Take 1 tablet (10 mg total) by mouth 3 (three) times a day   • busPIRone (BUSPAR) 15 mg tablet    • clonazePAM (KlonoPIN) 0.5 mg tablet TAKE 0.5-1 TAB BY MOUTH TWICE A DAY AS NEEDED FOR ANXIETY   • dicyclomine (BENTYL) 20 mg tablet Take 1 tablet (20 mg total) by mouth 2 (two) times a day   • flecainide (TAMBOCOR) 100 mg tablet    • Linzess 72 MCG CAPS Take 1 capsule by mouth daily   • lithium 300 MG tablet    • meloxicam (MOBIC) 7.5 mg tablet Take 1 tablet (7.5 mg total) by mouth daily   • ondansetron (ZOFRAN) 4 mg tablet Take 1 tablet (4 mg total) by mouth every 6 (six) hours   • pantoprazole (PROTONIX) 40 mg tablet Take 1 tablet by mouth daily   • pregabalin (LYRICA) 200 MG capsule Take 1 capsule (200 mg total) by mouth 2 (two) times a day   • topiramate (TOPAMAX) 100 mg tablet Take 100 mg by mouth 2 (two) times a day   • venlafaxine (EFFEXOR-XR) 75 mg 24 hr capsule    • ZOLMitriptan (ZOMIG) 5 MG tablet Take 5 mg by mouth once as needed for migraine May repeat in 2 hours if unresolved. Do not exceed 10 mg in 24 hours. • ARIPiprazole (ABILIFY) 5 mg tablet Take 5 mg by mouth daily   • asenapine (SAPHRIS) 5 mg SL tablet Place 5 mg under the tongue daily at bedtime (Patient not taking: Reported on 6/23/2023)   • benzonatate (TESSALON) 200 MG capsule Take 1 capsule (200 mg total) by mouth 3 (three) times a day as needed for cough (Patient not taking: Reported on 6/16/2023)   • brompheniramine-pseudoephedrine-DM 30-2-10 MG/5ML syrup Take 10 mL by mouth 4 (four) times a day as needed for congestion or cough (Patient not taking: Reported on 10/10/2022)   • buPROPion (WELLBUTRIN SR) 100 mg 12 hr tablet  (Patient not taking: Reported on 8/25/2023)   • [facility]ion [facility]) 100 mg 12 hr tablet Take 100 mg by mouth daily (Patient not taking: Reported on 6/16/2023)   • cycloSPORINE (RESTASIS) 0.05 % ophthalmic emulsion Administer 1 drop to both eyes 2 (two) times a day. (Patient not taking: Reported on 2/6/2023)   • dicyclomine (BENTYL) 20 mg tablet Take 20 mg by mouth 3 (three) times a day (Patient not taking: Reported on 2/6/2023)   • flecainide (TAMBOCOR) 50 mg tablet 150 mg  (Patient not taking: Reported on 6/16/2023)   • hydrOXYzine HCL (ATARAX) 25 mg tablet Take 25-50 mg by mouth every 6 (six) hours as needed (Patient not taking: Reported on 10/10/2022)   • hyoscyamine (LEVSIN/SL) 0.125 mg SL tablet Take 1 tablet (0.125 mg total) by mouth every 6 (six) hours as needed for cramping (Patient not taking: Reported on 3/20/2023)   • levothyroxine 100 mcg tablet Take 112 mcg by mouth daily.  (Patient not taking: Reported on 6/23/2023)   • levothyroxine 137 mcg tablet  (Patient not taking: Reported on 10/5/2022)   • metoprolol succinate (TOPROL-XL) 25 mg 24 hr tablet Take 25 mg by mouth 2 (two) times a day   • venlafaxine (EFFEXOR-XR) 150 mg 24 hr capsule Take 150 mg by mouth daily. (Patient not taking: Reported on 6/23/2023)   • venlafaxine (EFFEXOR-XR) 37.5 mg 24 hr capsule Take 37.5 mg by mouth daily. (Patient not taking: Reported on 6/23/2023)   • zolpidem (AMBIEN CR) 12.5 MG CR tablet Take 10 mg by mouth daily at bedtime as needed for sleep. (Patient not taking: Reported on 10/5/2022)     No current facility-administered medications on file prior to visit. Allergies   Allergen Reactions   • Pollen Extract        Physical Exam:    /78   Ht 5' 7" (1.702 m)   Wt 86.2 kg (190 lb)   BMI 29.76 kg/m²     Constitutional:normal, well developed, well nourished, alert, in no distress and non-toxic and no overt pain behavior. Eyes:anicteric  HEENT:grossly intact  Neck:supple, symmetric, trachea midline and no masses   Pulmonary:even and unlabored  Cardiovascular:No edema or pitting edema present  Skin:Normal without rashes or lesions and well hydrated  Psychiatric:Mood and affect appropriate  Neurologic: Motor function is grossly intact with no focal neurologic deficits. Musculoskeletal: Tenderness in the right greater than left lumbar paraspinal region. Positive right Duncan's test.  Positive right SI compression and distraction tests.     Imaging

## 2023-09-21 ENCOUNTER — HOSPITAL ENCOUNTER (OUTPATIENT)
Dept: RADIOLOGY | Facility: MEDICAL CENTER | Age: 48
Discharge: HOME | End: 2023-09-21
Admitting: ANESTHESIOLOGY
Payer: MEDICARE

## 2023-09-21 VITALS
OXYGEN SATURATION: 94 % | DIASTOLIC BLOOD PRESSURE: 91 MMHG | RESPIRATION RATE: 18 BRPM | SYSTOLIC BLOOD PRESSURE: 131 MMHG | HEART RATE: 69 BPM | TEMPERATURE: 98 F

## 2023-09-21 DIAGNOSIS — M46.1 SACROILIITIS, NOT ELSEWHERE CLASSIFIED (HCC): ICD-10-CM

## 2023-09-21 PROCEDURE — 27096 INJECT SACROILIAC JOINT: CPT | Performed by: ANESTHESIOLOGY

## 2023-09-21 RX ORDER — BUPIVACAINE HCL/PF 2.5 MG/ML
3 VIAL (ML) INJECTION ONCE
Status: COMPLETED | OUTPATIENT
Start: 2023-09-21 | End: 2023-09-21

## 2023-09-21 RX ORDER — LIDOCAINE HYDROCHLORIDE 10 MG/ML
2 INJECTION, SOLUTION EPIDURAL; INFILTRATION; INTRACAUDAL; PERINEURAL ONCE
Status: COMPLETED | OUTPATIENT
Start: 2023-09-21 | End: 2023-09-21

## 2023-09-21 RX ORDER — METHYLPREDNISOLONE ACETATE 40 MG/ML
40 INJECTION, SUSPENSION INTRA-ARTICULAR; INTRALESIONAL; INTRAMUSCULAR; PARENTERAL; SOFT TISSUE ONCE
Status: COMPLETED | OUTPATIENT
Start: 2023-09-21 | End: 2023-09-21

## 2023-09-21 RX ADMIN — Medication 3 ML: at 13:12

## 2023-09-21 RX ADMIN — LIDOCAINE HYDROCHLORIDE 2 ML: 10 INJECTION, SOLUTION EPIDURAL; INFILTRATION; INTRACAUDAL; PERINEURAL at 13:10

## 2023-09-21 RX ADMIN — METHYLPREDNISOLONE ACETATE 40 MG: 40 INJECTION, SUSPENSION INTRA-ARTICULAR; INTRALESIONAL; INTRAMUSCULAR; PARENTERAL; SOFT TISSUE at 13:12

## 2023-09-21 RX ADMIN — IOHEXOL 0.5 ML: 300 INJECTION, SOLUTION INTRAVENOUS at 13:12

## 2023-09-21 NOTE — H&P
History of Present Illness: The patient is a 46 y.o. female who presents with complaints of bilateral low back pain    Past Medical History:   Diagnosis Date   • Anxiety    • Bipolar 1 disorder ([address])    • Depression    • Disease of thyroid gland    • Fibromyalgia, primary    • GERD (gastroesophageal reflux disease) 2015   • Inflammatory bowel disease    • Thyroid disease        Past Surgical History:   Procedure Laterality Date   • APPENDECTOMY     • EPIDURAL BLOCK INJECTION  2017? • KNEE SURGERY     • ORTHOPEDIC SURGERY  2008   • TOTAL THYROIDECTOMY           Current Outpatient Medications:   •  albuterol (VENTOLIN HFA) 90 mcg/act inhaler, Inhale 2 puffs every 6 (six) hours as needed for wheezing, Disp: 18 g, Rfl: 0  •  ARIPiprazole (ABILIFY) 5 mg tablet, Take 5 mg by mouth daily, Disp: , Rfl:   •  asenapine (SAPHRIS) 5 mg SL tablet, Place 5 mg under the tongue daily at bedtime (Patient not taking: Reported on 6/23/2023), Disp: , Rfl:   •  baclofen 10 mg tablet, Take 1 tablet (10 mg total) by mouth 3 (three) times a day, Disp: 90 tablet, Rfl: 2  •  benzonatate (TESSALON) 200 MG capsule, Take 1 capsule (200 mg total) by mouth 3 (three) times a day as needed for cough (Patient not taking: Reported on 6/16/2023), Disp: 20 capsule, Rfl: 0  •  brompheniramine-pseudoephedrine-DM 30-2-10 MG/5ML syrup, Take 10 mL by mouth 4 (four) times a day as needed for congestion or cough (Patient not taking: Reported on 10/10/2022), Disp: 120 mL, Rfl: 0  •  buPROPion (WELLBUTRIN SR) 100 mg 12 hr tablet, , Disp: , Rfl:   •  buPROPion (WELLBUTRIN SR) 100 mg 12 hr tablet, Take 100 mg by mouth daily (Patient not taking: Reported on 6/16/2023), Disp: , Rfl:   •  busPIRone (BUSPAR) 15 mg tablet, , Disp: , Rfl:   •  clonazePAM (KlonoPIN) 0.5 mg tablet, TAKE 0.5-1 TAB BY MOUTH TWICE A DAY AS NEEDED FOR ANXIETY, Disp: , Rfl:   •  cycloSPORINE (RESTASIS) 0.05 % ophthalmic emulsion, Administer 1 drop to both eyes 2 (two) times a day.  (Patient not taking: Reported on 2/6/2023), Disp: , Rfl:   •  dicyclomine (BENTYL) 20 mg tablet, Take 20 mg by mouth 3 (three) times a day (Patient not taking: Reported on 2/6/2023), Disp: , Rfl:   •  dicyclomine (BENTYL) 20 mg tablet, Take 1 tablet (20 mg total) by mouth 2 (two) times a day, Disp: 20 tablet, Rfl: 0  •  flecainide (TAMBOCOR) 100 mg tablet, , Disp: , Rfl:   •  flecainide (TAMBOCOR) 50 mg tablet, 150 mg  (Patient not taking: Reported on 6/16/2023), Disp: , Rfl:   •  hydrOXYzine HCL (ATARAX) 25 mg tablet, Take 25-50 mg by mouth every 6 (six) hours as needed (Patient not taking: Reported on 10/10/2022), Disp: , Rfl:   •  hyoscyamine (LEVSIN/SL) 0.125 mg SL tablet, Take 1 tablet (0.125 mg total) by mouth every 6 (six) hours as needed for cramping (Patient not taking: Reported on 3/20/2023), Disp: 60 tablet, Rfl: 3  •  levothyroxine 100 mcg tablet, Take 112 mcg by mouth daily. (Patient not taking: Reported on 6/23/2023), Disp: , Rfl:   •  levothyroxine 137 mcg tablet, , Disp: , Rfl:   •  Linzess 72 MCG CAPS, Take 1 capsule by mouth daily, Disp: , Rfl:   •  lithium 300 MG tablet, , Disp: , Rfl:   •  meloxicam (MOBIC) 7.5 mg tablet, Take 1 tablet (7.5 mg total) by mouth daily, Disp: 30 tablet, Rfl: 3  •  metoprolol succinate (TOPROL-XL) 25 mg 24 hr tablet, Take 25 mg by mouth 2 (two) times a day, Disp: , Rfl:   •  ondansetron (ZOFRAN) 4 mg tablet, Take 1 tablet (4 mg total) by mouth every 6 (six) hours, Disp: 12 tablet, Rfl: 0  •  pantoprazole (PROTONIX) 40 mg tablet, Take 1 tablet by mouth daily, Disp: , Rfl:   •  pregabalin (LYRICA) 200 MG capsule, Take 1 capsule (200 mg total) by mouth 2 (two) times a day, Disp: 60 capsule, Rfl: 2  •  topiramate (TOPAMAX) 100 mg tablet, Take 100 mg by mouth 2 (two) times a day, Disp: , Rfl:   •  venlafaxine (EFFEXOR-XR) 150 mg 24 hr capsule, Take 150 mg by mouth daily.  (Patient not taking: Reported on 6/23/2023), Disp: , Rfl:   •  venlafaxine (EFFEXOR-XR) 37.5 mg 24 hr capsule, Take 37.5 mg by mouth daily. (Patient not taking: Reported on 6/23/2023), Disp: , Rfl:   •  venlafaxine (EFFEXOR-XR) 75 mg 24 hr capsule, , Disp: , Rfl:   •  ZOLMitriptan (ZOMIG) 5 MG tablet, Take 5 mg by mouth once as needed for migraine May repeat in 2 hours if unresolved. Do not exceed 10 mg in 24 hours. , Disp: , Rfl:     Current Facility-Administered Medications:   •  bupivacaine (PF) (MARCAINE) 0.25 % injection 3 mL, 3 mL, Intra-articular, Once, Will Stefan Swanson MD  •  iohexol (OMNIPAQUE) 300 mg/mL injection 0.5 mL, 0.5 mL, Intra-articular, Once, Will Stefan Swanson MD  •  lidocaine (PF) (XYLOCAINE-MPF) 1 % injection 2 mL, 2 mL, Infiltration, Once, Will Stefan Swanson MD  •  methylPREDNISolone acetate (DEPO-MEDROL) injection 40 mg, 40 mg, Intra-articular, Once, Will Stefan Swanson MD    Allergies   Allergen Reactions   • Pollen Extract        Physical Exam:   Vitals:    09/21/23 1302   BP: 143/89   Pulse: 58   Resp: 18   Temp: 98 °F (36.7 °C)   SpO2: 95%     General: Awake, Alert, Oriented x 3, Mood and affect appropriate  Respiratory: Respirations even and unlabored  Cardiovascular: Peripheral pulses intact; no edema  Musculoskeletal Exam: tenderness over bilateral SIJ    ASA Score: 2    Patient/Chart Verification  Patient ID Verified: Verbal  Consents Confirmed: Procedural, To be obtained in the Pre-Procedure area  H&P( within 30 days) Verified: To be obtained in the Pre-Procedure area  Allergies Reviewed: Yes  Anticoag/NSAID held?: NA  Currently on antibiotics?: No  Pregnancy denied?: Yes    Assessment:   1.  Sacroiliitis, not elsewhere classified ([address])        Plan: right SIJ injection injection w/ fluoroscopy

## 2023-09-21 NOTE — DISCHARGE INSTRUCTIONS

## 2023-10-02 DIAGNOSIS — G89.4 CHRONIC PAIN SYNDROME: ICD-10-CM

## 2023-10-02 RX ORDER — PREGABALIN 200 MG/1
200 CAPSULE ORAL 2 TIMES DAILY
Qty: 60 CAPSULE | Refills: 2 | Status: SHIPPED | OUTPATIENT
Start: 2023-10-02

## 2023-10-02 NOTE — TELEPHONE ENCOUNTER
Reason for call:   [x] Refill   [] Prior Auth  [] Other:     Office:   [] PCP/Provider -   [x] Speciality/Provider - [facility]    Medication: Pregabalin     Dose/Frequency: 200mg BID    Quantity: 60    Pharmacy: [facility]    Does the patient have enough for 3 days?    [x] Yes   [] No - Send as HP to POD

## 2023-10-02 NOTE — TELEPHONE ENCOUNTER
S/W pt. Pt requesting refill of lyrica as it is listed in her chart. She denies side effects and it helps her. Pt stated call back is not needed once it is sent. Please advise.

## 2023-10-31 DIAGNOSIS — M79.18 MYOFASCIAL PAIN SYNDROME: ICD-10-CM

## 2023-10-31 RX ORDER — BACLOFEN 10 MG/1
10 TABLET ORAL 3 TIMES DAILY
Qty: 90 TABLET | Refills: 2 | Status: SHIPPED | OUTPATIENT
Start: 2023-10-31

## 2023-11-01 ENCOUNTER — OFFICE VISIT (OUTPATIENT)
Dept: PAIN MEDICINE | Facility: CLINIC | Age: 48
End: 2023-11-01
Payer: MEDICARE

## 2023-11-01 ENCOUNTER — TELEPHONE (OUTPATIENT)
Dept: PAIN MEDICINE | Facility: CLINIC | Age: 48
End: 2023-11-01

## 2023-11-01 VITALS
SYSTOLIC BLOOD PRESSURE: 112 MMHG | BODY MASS INDEX: 29.82 KG/M2 | HEIGHT: 67 IN | DIASTOLIC BLOOD PRESSURE: 78 MMHG | WEIGHT: 190 LBS

## 2023-11-01 DIAGNOSIS — G89.4 CHRONIC PAIN SYNDROME: ICD-10-CM

## 2023-11-01 DIAGNOSIS — M47.817 SPONDYLOSIS OF LUMBOSACRAL REGION, UNSPECIFIED SPINAL OSTEOARTHRITIS COMPLICATION STATUS: Primary | ICD-10-CM

## 2023-11-01 DIAGNOSIS — M46.1 SACROILIITIS, NOT ELSEWHERE CLASSIFIED (HCC): ICD-10-CM

## 2023-11-01 PROCEDURE — 99214 OFFICE O/P EST MOD 30 MIN: CPT | Performed by: ANESTHESIOLOGY

## 2023-11-01 NOTE — TELEPHONE ENCOUNTER
The patient has been experiencing moderate to severe axial spine pain that is causing functional deficit. The pain has been present for at least 3 months and is not improving with conservative care. Currently the patient is not experiencing any radicular features or neurogenic claudication. Non-Facet pathology has been ruled out on clinical evaluation.

## 2023-11-01 NOTE — PROGRESS NOTES
Assessment:  1. Spondylosis of lumbosacral region, unspecified spinal osteoarthritis complication status    2. Sacroiliitis, not elsewhere classified ([address])    3. Chronic pain syndrome        Plan:  While the patient was in the office today, I did have a thorough conversation regarding their chronic pain syndrome, medication management, and treatment plan options. The patient underwent right sacroiliac joint injection on 09/21/2023. Unfortunately she did not obtain the same amount of relief that she did with her prior bilateral sacroiliac joint injections which offer for 50% improvement for 3 months. She does note that her pain is more in the lumbar area in a belt-like fashion. She does have noted facet arthropathy at L4-5 and L5-S1 on lumbar MRI. We discussed moving in a stepwise fashion -I offered her diagnostic bilateral lumbar medial branch blocks as the next treatment modality.    - We will schedule the patient for bilateral L3-5 medial branch nerve blocks with intention of moving forward towards radiofrequency ablation if there is an appropriate diagnostic response. The initial blocks will be performed with 2% lidocaine and if an appropriate response is obtained upon review of the patient's pain diary, a confirmatory block will be scheduled with 0.5% bupivacaine. In the office today, we reviewed the nature of facet joint pathology in depth using a spine model. We discussed the approach we would use for the injections and provided literature for home review. The patient understands the risks associated with the procedure including bleeding, infection, tissue injury, and allergic reaction and provided verbal informed consent in the office today. - We will continue with her pregabalin 200 mg twice daily and baclofen 10 mg 3 times daily regimen for chronic pain syndrome.      [place] Prescription Drug Monitoring Program report was reviewed and was appropriate      My impressions and treatment recommendations were discussed in detail with the patient who verbalized understanding and had no further questions. Discharge instructions were provided. I personally saw and examined the patient and I agree with the above discussed plan of care. Orders Placed This Encounter   Procedures    FL spine and pain procedure     Standing Status:   Future     Standing Expiration Date:   11/1/2027     Order Specific Question:   Reason for Exam:     Answer:   bilateral L3-5 MBB#1     Order Specific Question:   Is the patient pregnant? Answer:   No     Order Specific Question:   Anticoagulant hold needed? Answer:   no     No orders of the defined types were placed in this encounter. History of Present Illness:  Shy Devine is a 46 y.o. female who presents for a follow up office visit in regards to Back Pain. The patient’s current symptoms include unchanged bilateral low back pain symptoms rated 5/10 and described as an intermittent dull/aching, pressure-like, shooting pain worse at night. I have personally reviewed and/or updated the patient's past medical history, past surgical history, family history, social history, current medications, allergies, and vital signs today. Review of Systems   Constitutional:  Negative for chills and fever. HENT:  Negative for ear pain and sore throat. Eyes:  Negative for pain and visual disturbance. Respiratory:  Negative for cough and shortness of breath. Cardiovascular:  Negative for chest pain and palpitations. Gastrointestinal:  Negative for abdominal pain and vomiting. Genitourinary:  Negative for dysuria and hematuria. Musculoskeletal:  Positive for arthralgias, back pain and gait problem. Skin:  Negative for color change and rash. Neurological:  Negative for seizures and syncope. All other systems reviewed and are negative.       Patient Active Problem List   Diagnosis    Bipolar 1 disorder ([address])    Thyroid disease    Anxiety    Chronic pain syndrome    Lumbar radiculopathy    Foraminal stenosis of lumbar region    Sacroiliitis [facility])       Past Medical History:   Diagnosis Date    Anxiety     Bipolar 1 disorder ([address])     Depression     Disease of thyroid gland     Fibromyalgia, primary     GERD (gastroesophageal reflux disease) 2015    Inflammatory bowel disease     Thyroid disease        Past Surgical History:   Procedure Laterality Date    APPENDECTOMY      EPIDURAL BLOCK INJECTION  2017?     KNEE SURGERY      ORTHOPEDIC SURGERY  2008    TOTAL THYROIDECTOMY         Family History   Problem Relation Age of Onset    Cancer Mother     Depression Mother     Diabetes Mother     GI problems Mother     Hypertension Mother     Mental illness Mother     Migraines Mother     No Known Problems Father        Social History     Occupational History    Not on file   Tobacco Use    Smoking status: Every Day     Packs/day: 0.50     Years: 20.00     Total pack years: 10.00     Types: Cigarettes    Smokeless tobacco: Never   Vaping Use    Vaping Use: Never used   Substance and Sexual Activity    Alcohol use: Yes     Comment: socially     Drug use: No    Sexual activity: Yes     Partners: Male     Birth control/protection: None       Current Outpatient Medications on File Prior to Visit   Medication Sig    albuterol (VENTOLIN HFA) 90 mcg/act inhaler Inhale 2 puffs every 6 (six) hours as needed for wheezing    baclofen 10 mg tablet TAKE 1 TABLET BY MOUTH THREE TIMES A DAY    busPIRone (BUSPAR) 15 mg tablet     clonazePAM (KlonoPIN) 0.5 mg tablet TAKE 0.5-1 TAB BY MOUTH TWICE A DAY AS NEEDED FOR ANXIETY    dicyclomine (BENTYL) 20 mg tablet Take 1 tablet (20 mg total) by mouth 2 (two) times a day    flecainide (TAMBOCOR) 100 mg tablet     Linzess 72 MCG CAPS Take 1 capsule by mouth daily    lithium 300 MG tablet     meloxicam (MOBIC) 7.5 mg tablet Take 1 tablet (7.5 mg total) by mouth daily    ondansetron (ZOFRAN) 4 mg tablet Take 1 tablet (4 mg total) by mouth every 6 (six) hours    pantoprazole (PROTONIX) 40 mg tablet Take 1 tablet by mouth daily    pregabalin (LYRICA) 200 MG capsule Take 1 capsule (200 mg total) by mouth 2 (two) times a day    topiramate (TOPAMAX) 100 mg tablet Take 100 mg by mouth 2 (two) times a day    venlafaxine (EFFEXOR-XR) 75 mg 24 hr capsule     ZOLMitriptan (ZOMIG) 5 MG tablet Take 5 mg by mouth once as needed for migraine May repeat in 2 hours if unresolved. Do not exceed 10 mg in 24 hours. ARIPiprazole (ABILIFY) 5 mg tablet Take 5 mg by mouth daily    asenapine (SAPHRIS) 5 mg SL tablet Place 5 mg under the tongue daily at bedtime (Patient not taking: Reported on 6/23/2023)    benzonatate (TESSALON) 200 MG capsule Take 1 capsule (200 mg total) by mouth 3 (three) times a day as needed for cough (Patient not taking: Reported on 6/16/2023)    brompheniramine-pseudoephedrine-DM 30-2-10 MG/5ML syrup Take 10 mL by mouth 4 (four) times a day as needed for congestion or cough (Patient not taking: Reported on 10/10/2022)    buPROPion (WELLBUTRIN SR) 100 mg 12 hr tablet  (Patient not taking: Reported on 8/25/2023)    buPROPion (WELLBUTRIN SR) 100 mg 12 hr tablet Take 100 mg by mouth daily (Patient not taking: Reported on 6/16/2023)    cycloSPORINE (RESTASIS) 0.05 % ophthalmic emulsion Administer 1 drop to both eyes 2 (two) times a day. (Patient not taking: Reported on 2/6/2023)    dicyclomine (BENTYL) 20 mg tablet Take 20 mg by mouth 3 (three) times a day (Patient not taking: Reported on 2/6/2023)    flecainide (TAMBOCOR) 50 mg tablet 150 mg  (Patient not taking: Reported on 6/16/2023)    hydrOXYzine HCL (ATARAX) 25 mg tablet Take 25-50 mg by mouth every 6 (six) hours as needed (Patient not taking: Reported on 10/10/2022)    hyoscyamine (LEVSIN/SL) 0.125 mg SL tablet Take 1 tablet (0.125 mg total) by mouth every 6 (six) hours as needed for cramping (Patient not taking: Reported on 3/20/2023)    levothyroxine 100 mcg tablet Take 112 mcg by mouth daily. (Patient not taking: Reported on 6/23/2023)    levothyroxine 137 mcg tablet  (Patient not taking: Reported on 10/5/2022)    metoprolol succinate (TOPROL-XL) 25 mg 24 hr tablet Take 25 mg by mouth 2 (two) times a day    venlafaxine (EFFEXOR-XR) 150 mg 24 hr capsule Take 150 mg by mouth daily. (Patient not taking: Reported on 6/23/2023)    venlafaxine (EFFEXOR-XR) 37.5 mg 24 hr capsule Take 37.5 mg by mouth daily. (Patient not taking: Reported on 6/23/2023)     No current facility-administered medications on file prior to visit. Allergies   Allergen Reactions    Pollen Extract        Physical Exam:    /78   Ht 5' 7" (1.702 m)   Wt 86.2 kg (190 lb)   BMI 29.76 kg/m²     Constitutional:normal, well developed, well nourished, alert, in no distress and non-toxic and no overt pain behavior. Eyes:anicteric  HEENT:grossly intact  Neck:supple, symmetric, trachea midline and no masses   Pulmonary:even and unlabored  Cardiovascular:No edema or pitting edema present  Skin:Normal without rashes or lesions and well hydrated  Psychiatric:Mood and affect appropriate  Neurologic:Motor function is grossly intact with no focal neurologic deficits   Musculoskeletal: Tenderness in the bilateral lumbar paraspinal muscles. Pain with lumbar extension flexion bilaterally. Imaging  Narrative & Impression   MRI LUMBAR SPINE WITHOUT CONTRAST     INDICATION: Chronic pain, neurogenic claudication     COMPARISON:  None. TECHNIQUE:  Sagittal T1, sagittal T2, sagittal inversion recovery, axial T1 and axial T2, coronal T2.    IMAGE QUALITY:  Diagnostic     FINDINGS:     VERTEBRAL BODIES:  There are 5 lumbar type vertebral bodies. There is mild retrolisthesis of L5 on S1. Endplate degenerative signal change at L5-S1. No suspicious discrete marrow lesion. SACRUM:  Normal signal within the sacrum. No evidence of insufficiency or stress fracture.      DISTAL CORD AND CONUS:  Normal size and signal within the distal cord and conus.     PARASPINAL SOFT TISSUES:  Paraspinal soft tissues are unremarkable. Cholelithiasis     LOWER THORACIC DISC SPACES:  Normal disc height and signal.  No disc herniation, canal stenosis or foraminal narrowing. LUMBAR DISC SPACES:     L1-L2:  No disc bulge. No canal or foraminal stenosis. L2-L3:  No disc bulge. No canal or foraminal stenosis. L3-L4:  No disc bulge. No canal or foraminal stenosis. L4-L5:  There is mild disc height loss with degenerative disc dehydration. No significant disc bulge. No canal or significant foraminal stenosis. L5-S1: Moderate disc height loss with degenerative disc dehydration. Mild retrolisthesis and small disc bulge without significant canal stenosis. Mild bilateral foraminal narrowing. IMPRESSION:     1. Disc degenerative change at levels L4-5 and L5-S1 without significant canal stenosis. Mild bilateral foraminal narrowing at L5-S1.     2.  Cholelithiasis.

## 2023-11-15 ENCOUNTER — TELEPHONE (OUTPATIENT)
Dept: PAIN MEDICINE | Facility: CLINIC | Age: 48
End: 2023-11-15

## 2023-11-15 NOTE — TELEPHONE ENCOUNTER
Patient reports a pain score of 8/10. She has done PT back in September 2022 with minimal relief. Since 9/1/2022-present she continues home exercise and stretching regimen 2-3 times a week. She is scheduled for MBB on 11/30/2023.

## 2023-11-19 DIAGNOSIS — M47.816 LUMBAR SPONDYLOSIS: ICD-10-CM

## 2023-11-20 RX ORDER — MELOXICAM 7.5 MG/1
7.5 TABLET ORAL DAILY
Qty: 30 TABLET | Refills: 3 | Status: SHIPPED | OUTPATIENT
Start: 2023-11-20

## 2023-11-28 ENCOUNTER — TELEPHONE (OUTPATIENT)
Dept: PAIN MEDICINE | Facility: CLINIC | Age: 48
End: 2023-11-28

## 2023-11-28 NOTE — TELEPHONE ENCOUNTER
Patient called and she needed to reschedule procedure for 11/30. Denilson Crawley is still pending and we would have to wait until approval anyway. LMOM for her to return my call and reschedule.

## 2024-01-02 DIAGNOSIS — G89.4 CHRONIC PAIN SYNDROME: ICD-10-CM

## 2024-01-02 NOTE — TELEPHONE ENCOUNTER
Reason for call:   [x] Refill   [] Prior Auth  [] Other:     Office:   [] PCP/Provider -   [x] Specialty/Provider - ORTHO    Medication: LYRICA    Dose/Frequency: 200 MG    Quantity: 60    Pharmacy: [address]    Does the patient have enough for 3 days?   [x] Yes   [] No - Send as HP to POD

## 2024-01-02 NOTE — TELEPHONE ENCOUNTER
LMOM for pt to C/B, C/B # provided.    --when pt calls back need to confirm dose, frequency, any side effects and if the medication is helping--

## 2024-01-05 RX ORDER — PREGABALIN 200 MG/1
200 CAPSULE ORAL 2 TIMES DAILY
Qty: 60 CAPSULE | Refills: 2 | Status: SHIPPED | OUTPATIENT
Start: 2024-01-05

## 2024-01-05 NOTE — TELEPHONE ENCOUNTER
Caller: jose Galeana    Doctor: [facility]    Reason for call: pt returning nurses call    Call back#: [phone]

## 2024-01-05 NOTE — TELEPHONE ENCOUNTER
S/w pt who is requesting a refill of Pregabalin 200 mg.Confirmed pt is taking it bid, it helps her pain and she has no se's.   LP 10/2 #60 with 2 refills.  Last OVS 11/1/23, pt has MBB scheduled on 1/18./23  Pt has none left.   Pt does not need cb when script filled.

## 2024-01-17 ENCOUNTER — TELEPHONE (OUTPATIENT)
Age: 49
End: 2024-01-17

## 2024-01-17 NOTE — TELEPHONE ENCOUNTER
Caller: pt    Doctor: tia    Reason for call: pt is sick and needs to r/s her procedure.    Call back#: [phone]

## 2024-01-23 DIAGNOSIS — M79.18 MYOFASCIAL PAIN SYNDROME: ICD-10-CM

## 2024-01-24 RX ORDER — BACLOFEN 10 MG/1
10 TABLET ORAL 3 TIMES DAILY
Qty: 90 TABLET | Refills: 2 | Status: SHIPPED | OUTPATIENT
Start: 2024-01-24

## 2024-04-02 DIAGNOSIS — G89.4 CHRONIC PAIN SYNDROME: ICD-10-CM

## 2024-04-02 DIAGNOSIS — M47.816 LUMBAR SPONDYLOSIS: ICD-10-CM

## 2024-04-02 NOTE — TELEPHONE ENCOUNTER
Reason for call:   [x] Refill   [] Prior Auth  [] Other:     Office:   [] PCP/Provider -   [x] Specialty/Provider - [facility]PA /     Medication: pregabalin (LYRICA) 200 MG capsule     Dose/Frequency: Take 1 capsule (200 mg total) by mouth 2 (two) times a day     Quantity: 60 + 2 refills    Pharmacy: [facility]/pharmacy #[id number] - [place] - [address] [place] [address]     Does the patient have enough for 3 days?   [x] Yes   [] No - Send as HP to POD

## 2024-04-03 RX ORDER — MELOXICAM 7.5 MG/1
7.5 TABLET ORAL DAILY
Qty: 30 TABLET | Refills: 3 | Status: SHIPPED | OUTPATIENT
Start: 2024-04-03

## 2024-04-03 RX ORDER — PREGABALIN 200 MG/1
200 CAPSULE ORAL 2 TIMES DAILY
Qty: 60 CAPSULE | Refills: 2 | Status: SHIPPED | OUTPATIENT
Start: 2024-04-03

## 2024-04-25 ENCOUNTER — HOSPITAL ENCOUNTER (OUTPATIENT)
Dept: RADIOLOGY | Facility: MEDICAL CENTER | Age: 49
End: 2024-04-25
Payer: MEDICARE

## 2024-04-25 VITALS
DIASTOLIC BLOOD PRESSURE: 88 MMHG | HEART RATE: 72 BPM | OXYGEN SATURATION: 94 % | RESPIRATION RATE: 16 BRPM | TEMPERATURE: 98.3 F | SYSTOLIC BLOOD PRESSURE: 130 MMHG

## 2024-04-25 DIAGNOSIS — M46.1 SACROILIITIS (HCC): ICD-10-CM

## 2024-04-25 PROCEDURE — 27096 INJECT SACROILIAC JOINT: CPT | Performed by: ANESTHESIOLOGY

## 2024-04-25 RX ORDER — ROPIVACAINE HYDROCHLORIDE 2 MG/ML
3 INJECTION, SOLUTION EPIDURAL; INFILTRATION; PERINEURAL ONCE
Status: COMPLETED | OUTPATIENT
Start: 2024-04-25 | End: 2024-04-25

## 2024-04-25 RX ORDER — METHYLPREDNISOLONE ACETATE 40 MG/ML
40 INJECTION, SUSPENSION INTRA-ARTICULAR; INTRALESIONAL; INTRAMUSCULAR; PARENTERAL; SOFT TISSUE ONCE
Status: COMPLETED | OUTPATIENT
Start: 2024-04-25 | End: 2024-04-25

## 2024-04-25 RX ORDER — LIDOCAINE HYDROCHLORIDE 10 MG/ML
5 INJECTION, SOLUTION EPIDURAL; INFILTRATION; INTRACAUDAL; PERINEURAL ONCE
Status: COMPLETED | OUTPATIENT
Start: 2024-04-25 | End: 2024-04-25

## 2024-04-25 RX ADMIN — IOHEXOL 0.5 ML: 300 INJECTION, SOLUTION INTRAVENOUS at 11:57

## 2024-04-25 RX ADMIN — LIDOCAINE HYDROCHLORIDE 5 ML: 10 INJECTION, SOLUTION EPIDURAL; INFILTRATION; INTRACAUDAL; PERINEURAL at 11:56

## 2024-04-25 RX ADMIN — METHYLPREDNISOLONE ACETATE 40 MG: 40 INJECTION, SUSPENSION INTRA-ARTICULAR; INTRALESIONAL; INTRAMUSCULAR; PARENTERAL; SOFT TISSUE at 11:58

## 2024-04-25 RX ADMIN — ROPIVACAINE HYDROCHLORIDE 3 ML: 2 INJECTION, SOLUTION EPIDURAL; INFILTRATION; PERINEURAL at 11:58

## 2024-04-25 NOTE — DISCHARGE INSTRUCTIONS

## 2024-04-25 NOTE — H&P
History of Present Illness: The patient is a 49 y.o. female who presents with complaints of low back pain    Past Medical History:   Diagnosis Date    Anxiety     Bipolar 1 disorder (HCC)     Depression     Disease of thyroid gland     Fibromyalgia, primary     GERD (gastroesophageal reflux disease) 2015    Inflammatory bowel disease     Thyroid disease        Past Surgical History:   Procedure Laterality Date    APPENDECTOMY      EPIDURAL BLOCK INJECTION  2017?    KNEE SURGERY      ORTHOPEDIC SURGERY  2008    TOTAL THYROIDECTOMY           Current Outpatient Medications:     albuterol (VENTOLIN HFA) 90 mcg/act inhaler, Inhale 2 puffs every 6 (six) hours as needed for wheezing, Disp: 18 g, Rfl: 0    ARIPiprazole (ABILIFY) 5 mg tablet, Take 5 mg by mouth daily, Disp: , Rfl:     asenapine (SAPHRIS) 5 mg SL tablet, Place 5 mg under the tongue daily at bedtime (Patient not taking: Reported on 6/23/2023), Disp: , Rfl:     baclofen 10 mg tablet, TAKE 1 TABLET BY MOUTH THREE TIMES A DAY, Disp: 90 tablet, Rfl: 2    benzonatate (TESSALON) 200 MG capsule, Take 1 capsule (200 mg total) by mouth 3 (three) times a day as needed for cough (Patient not taking: Reported on 6/16/2023), Disp: 20 capsule, Rfl: 0    brompheniramine-pseudoephedrine-DM 30-2-10 MG/5ML syrup, Take 10 mL by mouth 4 (four) times a day as needed for congestion or cough (Patient not taking: Reported on 10/10/2022), Disp: 120 mL, Rfl: 0    buPROPion (WELLBUTRIN SR) 100 mg 12 hr tablet, , Disp: , Rfl:     buPROPion (WELLBUTRIN SR) 100 mg 12 hr tablet, Take 100 mg by mouth daily (Patient not taking: Reported on 6/16/2023), Disp: , Rfl:     busPIRone (BUSPAR) 15 mg tablet, , Disp: , Rfl:     clonazePAM (KlonoPIN) 0.5 mg tablet, TAKE 0.5-1 TAB BY MOUTH TWICE A DAY AS NEEDED FOR ANXIETY, Disp: , Rfl:     cycloSPORINE (RESTASIS) 0.05 % ophthalmic emulsion, Administer 1 drop to both eyes 2 (two) times a day. (Patient not taking: Reported on 2/6/2023), Disp: , Rfl:      dicyclomine (BENTYL) 20 mg tablet, Take 20 mg by mouth 3 (three) times a day (Patient not taking: Reported on 2/6/2023), Disp: , Rfl:     dicyclomine (BENTYL) 20 mg tablet, Take 1 tablet (20 mg total) by mouth 2 (two) times a day, Disp: 20 tablet, Rfl: 0    flecainide (TAMBOCOR) 100 mg tablet, , Disp: , Rfl:     flecainide (TAMBOCOR) 50 mg tablet, 150 mg  (Patient not taking: Reported on 6/16/2023), Disp: , Rfl:     hydrOXYzine HCL (ATARAX) 25 mg tablet, Take 25-50 mg by mouth every 6 (six) hours as needed (Patient not taking: Reported on 10/10/2022), Disp: , Rfl:     hyoscyamine (LEVSIN/SL) 0.125 mg SL tablet, Take 1 tablet (0.125 mg total) by mouth every 6 (six) hours as needed for cramping (Patient not taking: Reported on 3/20/2023), Disp: 60 tablet, Rfl: 3    levothyroxine 100 mcg tablet, Take 112 mcg by mouth daily. (Patient not taking: Reported on 6/23/2023), Disp: , Rfl:     levothyroxine 137 mcg tablet, , Disp: , Rfl:     Linzess 72 MCG CAPS, Take 1 capsule by mouth daily, Disp: , Rfl:     lithium 300 MG tablet, , Disp: , Rfl:     meloxicam (MOBIC) 7.5 mg tablet, TAKE 1 TABLET BY MOUTH EVERY DAY, Disp: 30 tablet, Rfl: 3    metoprolol succinate (TOPROL-XL) 25 mg 24 hr tablet, Take 25 mg by mouth 2 (two) times a day, Disp: , Rfl:     ondansetron (ZOFRAN) 4 mg tablet, Take 1 tablet (4 mg total) by mouth every 6 (six) hours, Disp: 12 tablet, Rfl: 0    pantoprazole (PROTONIX) 40 mg tablet, Take 1 tablet by mouth daily, Disp: , Rfl:     pregabalin (LYRICA) 200 MG capsule, Take 1 capsule (200 mg total) by mouth 2 (two) times a day, Disp: 60 capsule, Rfl: 2    topiramate (TOPAMAX) 100 mg tablet, Take 100 mg by mouth 2 (two) times a day, Disp: , Rfl:     venlafaxine (EFFEXOR-XR) 150 mg 24 hr capsule, Take 150 mg by mouth daily. (Patient not taking: Reported on 6/23/2023), Disp: , Rfl:     venlafaxine (EFFEXOR-XR) 37.5 mg 24 hr capsule, Take 37.5 mg by mouth daily. (Patient not taking: Reported on 6/23/2023), Disp: ,  Rfl:     venlafaxine (EFFEXOR-XR) 75 mg 24 hr capsule, , Disp: , Rfl:     ZOLMitriptan (ZOMIG) 5 MG tablet, Take 5 mg by mouth once as needed for migraine May repeat in 2 hours if unresolved. Do not exceed 10 mg in 24 hours., Disp: , Rfl:     Current Facility-Administered Medications:     iohexol (OMNIPAQUE) 300 mg/mL injection 0.5 mL, 0.5 mL, Intra-articular, Once, Will Adeline Dover MD    lidocaine (PF) (XYLOCAINE-MPF) 1 % injection 5 mL, 5 mL, Infiltration, Once, Will Adeline Dover MD    methylPREDNISolone acetate (DEPO-MEDROL) injection 40 mg, 40 mg, Intra-articular, Once, Will Adeline Dover MD    ropivacaine (NAROPIN) injection 3 mL, 3 mL, Intra-articular, Once, Will Adeline Dover MD    Allergies   Allergen Reactions    Pollen Extract        Physical Exam:   Vitals:    04/25/24 1139   BP: 134/90   Pulse: 70   Resp: 18   Temp: 98.3 °F (36.8 °C)   SpO2: 95%     General: Awake, Alert, Oriented x 3, Mood and affect appropriate  Respiratory: Respirations even and unlabored  Cardiovascular: Peripheral pulses intact; no edema  Musculoskeletal Exam: tenderness over bilateral SIJ    ASA Score: 2    Patient/Chart Verification  Patient ID Verified: Verbal  ID Band Applied: No  Consents Confirmed: Procedural  H&P( within 30 days) Verified: To be obtained in the Pre-Procedure area  Interval H&P(within 24 hr) Complete (required for Outpatients and Surgery Admit only): To be obtained in the Pre-Procedure area  Allergies Reviewed: Yes  Anticoag/NSAID held?: NA  Currently on antibiotics?: No  Pregnancy denied?: NA    Assessment:   1. Sacroiliitis (HCC)        Plan: right SIJ injection w/ fluoroscopy     Detail Level: Zone

## 2024-05-02 ENCOUNTER — TELEPHONE (OUTPATIENT)
Dept: PAIN MEDICINE | Facility: CLINIC | Age: 49
End: 2024-05-02

## 2024-05-24 ENCOUNTER — OFFICE VISIT (OUTPATIENT)
Dept: URGENT CARE | Age: 49
End: 2024-05-24

## 2024-05-24 VITALS
RESPIRATION RATE: 20 BRPM | WEIGHT: 190 LBS | DIASTOLIC BLOOD PRESSURE: 90 MMHG | OXYGEN SATURATION: 97 % | BODY MASS INDEX: 29.76 KG/M2 | TEMPERATURE: 97.3 F | HEART RATE: 71 BPM | SYSTOLIC BLOOD PRESSURE: 126 MMHG

## 2024-05-24 DIAGNOSIS — R53.83 FATIGUE, UNSPECIFIED TYPE: ICD-10-CM

## 2024-05-24 DIAGNOSIS — M43.6 NECK STIFFNESS: ICD-10-CM

## 2024-05-24 DIAGNOSIS — M79.10 MYALGIA: ICD-10-CM

## 2024-05-24 DIAGNOSIS — R41.0 CONFUSION: Primary | ICD-10-CM

## 2024-05-24 NOTE — PROGRESS NOTES
"  [facility]        NAME: Lissett Fountain is a 49 y.o. female  : 1975    MRN: [id number]  DATE: May 24, 2024  TIME: 12:50 PM    Assessment and Plan   Confusion [R41.0]  1. Confusion  Transfer to other facility      2. Fatigue, unspecified type  Transfer to other facility      3. Myalgia  Transfer to other facility      4. Neck stiffness          Patient will be reporting to the emergency room via private vehicle with her daughter driving for further workup.    Patient Instructions   Please report to the emergency room for further workup.  Follow up with PCP in 3-5 days.  Proceed to  ER if symptoms worsen.    If tests have been performed at [facility], our office will contact you with results if changes need to be made to the care plan discussed with you at the visit.  [facility]    Chief Complaint     Chief Complaint   Patient presents with    Fatigue     Pt states she had a tick bite last Fri and on Wed began with worsening confusion, \"brain fog\", dizziness, lightheadedness, nausea, muscle aches, joint stiffness, weakness and bilateral neck pain. Taking Tylenol with minimal relief.  Denies CP, SOB, vomiting, diarrhea.  Tick bite in left inguinal area.           History of Present Illness       48 y/o F presents for wide array of symptoms times last Wednesday.  Patient admits she was at work, working as a , when she started to give the wrong amount of change back.  Patient admits 2 days later she picked a tick off of the inside of her leg in her groin.  Denies seeing any bull's-eye rash but does admit she has a heat rash in her groin.  Patient admits since then she has developed muscle weakness, confusion, brain fog, joint stiffness, nausea, stiff neck, lightheadedness, dizziness currently patient admits to myalgias and a stiff neck.    Fatigue  Associated symptoms include fatigue and myalgias. Pertinent negatives include no chest pain, chills or " fever.       Review of Systems   Review of Systems   Constitutional:  Positive for fatigue. Negative for chills and fever.   Cardiovascular:  Negative for chest pain and palpitations.   Musculoskeletal:  Positive for myalgias.   Neurological:  Positive for dizziness and light-headedness.   Psychiatric/Behavioral:  Positive for confusion.          Current Medications       Current Outpatient Medications:     albuterol (VENTOLIN HFA) 90 mcg/act inhaler, Inhale 2 puffs every 6 (six) hours as needed for wheezing, Disp: 18 g, Rfl: 0    baclofen 10 mg tablet, TAKE 1 TABLET BY MOUTH THREE TIMES A DAY, Disp: 90 tablet, Rfl: 2    busPIRone (BUSPAR) 15 mg tablet, , Disp: , Rfl:     clonazePAM (KlonoPIN) 0.5 mg tablet, TAKE 0.5-1 TAB BY MOUTH TWICE A DAY AS NEEDED FOR ANXIETY, Disp: , Rfl:     Linzess 72 MCG CAPS, Take 1 capsule by mouth daily, Disp: , Rfl:     lithium 300 MG tablet, , Disp: , Rfl:     meloxicam (MOBIC) 7.5 mg tablet, TAKE 1 TABLET BY MOUTH EVERY DAY, Disp: 30 tablet, Rfl: 3    pantoprazole (PROTONIX) 40 mg tablet, Take 1 tablet by mouth daily, Disp: , Rfl:     pregabalin (LYRICA) 200 MG capsule, Take 1 capsule (200 mg total) by mouth 2 (two) times a day, Disp: 60 capsule, Rfl: 2    topiramate (TOPAMAX) 100 mg tablet, Take 100 mg by mouth 2 (two) times a day, Disp: , Rfl:     venlafaxine (EFFEXOR-XR) 75 mg 24 hr capsule, , Disp: , Rfl:     ZOLMitriptan (ZOMIG) 5 MG tablet, Take 5 mg by mouth once as needed for migraine May repeat in 2 hours if unresolved. Do not exceed 10 mg in 24 hours., Disp: , Rfl:     ARIPiprazole (ABILIFY) 5 mg tablet, Take 5 mg by mouth daily, Disp: , Rfl:     asenapine (SAPHRIS) 5 mg SL tablet, Place 5 mg under the tongue daily at bedtime (Patient not taking: Reported on 6/23/2023), Disp: , Rfl:     benzonatate (TESSALON) 200 MG capsule, Take 1 capsule (200 mg total) by mouth 3 (three) times a day as needed for cough (Patient not taking: Reported on 6/16/2023), Disp: 20 capsule, Rfl:  0    brompheniramine-pseudoephedrine-DM 30-2-10 MG/5ML syrup, Take 10 mL by mouth 4 (four) times a day as needed for congestion or cough (Patient not taking: Reported on 10/10/2022), Disp: 120 mL, Rfl: 0    buPROPion (WELLBUTRIN SR) 100 mg 12 hr tablet, , Disp: , Rfl:     buPROPion (WELLBUTRIN SR) 100 mg 12 hr tablet, Take 100 mg by mouth daily (Patient not taking: Reported on 6/16/2023), Disp: , Rfl:     cycloSPORINE (RESTASIS) 0.05 % ophthalmic emulsion, Administer 1 drop to both eyes 2 (two) times a day. (Patient not taking: Reported on 2/6/2023), Disp: , Rfl:     dicyclomine (BENTYL) 20 mg tablet, Take 20 mg by mouth 3 (three) times a day (Patient not taking: Reported on 2/6/2023), Disp: , Rfl:     dicyclomine (BENTYL) 20 mg tablet, Take 1 tablet (20 mg total) by mouth 2 (two) times a day (Patient not taking: Reported on 5/24/2024), Disp: 20 tablet, Rfl: 0    flecainide (TAMBOCOR) 100 mg tablet, , Disp: , Rfl:     flecainide (TAMBOCOR) 50 mg tablet, 150 mg  (Patient not taking: Reported on 6/16/2023), Disp: , Rfl:     hydrOXYzine HCL (ATARAX) 25 mg tablet, Take 25-50 mg by mouth every 6 (six) hours as needed (Patient not taking: Reported on 10/10/2022), Disp: , Rfl:     hyoscyamine (LEVSIN/SL) 0.125 mg SL tablet, Take 1 tablet (0.125 mg total) by mouth every 6 (six) hours as needed for cramping (Patient not taking: Reported on 3/20/2023), Disp: 60 tablet, Rfl: 3    levothyroxine 100 mcg tablet, Take 112 mcg by mouth daily. (Patient not taking: Reported on 6/23/2023), Disp: , Rfl:     levothyroxine 137 mcg tablet, , Disp: , Rfl:     metoprolol succinate (TOPROL-XL) 25 mg 24 hr tablet, Take 25 mg by mouth 2 (two) times a day, Disp: , Rfl:     ondansetron (ZOFRAN) 4 mg tablet, Take 1 tablet (4 mg total) by mouth every 6 (six) hours (Patient not taking: Reported on 5/24/2024), Disp: 12 tablet, Rfl: 0    venlafaxine (EFFEXOR-XR) 150 mg 24 hr capsule, Take 150 mg by mouth daily. (Patient not taking: Reported on  6/23/2023), Disp: , Rfl:     venlafaxine (EFFEXOR-XR) 37.5 mg 24 hr capsule, Take 37.5 mg by mouth daily. (Patient not taking: Reported on 6/23/2023), Disp: , Rfl:     Current Allergies     Allergies as of 05/24/2024 - Reviewed 05/24/2024   Allergen Reaction Noted    Pollen extract  08/16/2017            The following portions of the patient's history were reviewed and updated as appropriate: allergies, current medications, past family history, past medical history, past social history, past surgical history and problem list.     Past Medical History:   Diagnosis Date    Anxiety     Bipolar 1 disorder (HCC)     Depression     Disease of thyroid gland     Fibromyalgia, primary     GERD (gastroesophageal reflux disease) 2015    Inflammatory bowel disease     Thyroid disease        Past Surgical History:   Procedure Laterality Date    APPENDECTOMY      EPIDURAL BLOCK INJECTION  2017?    KNEE SURGERY      ORTHOPEDIC SURGERY  2008    TOTAL THYROIDECTOMY         Family History   Problem Relation Age of Onset    Cancer Mother     Depression Mother     Diabetes Mother     GI problems Mother     Hypertension Mother     Mental illness Mother     Migraines Mother     No Known Problems Father          Medications have been verified.        Objective   /90 (BP Location: Right arm, Patient Position: Sitting, Cuff Size: Standard)   Pulse 71   Temp (!) 97.3 °F (36.3 °C) (Tympanic)   Resp 20   Wt 86.2 kg (190 lb)   SpO2 97%   BMI 29.76 kg/m²   No LMP recorded. Patient has had an ablation.       Physical Exam     Physical Exam  Vitals and nursing note reviewed.   Constitutional:       General: She is not in acute distress.     Appearance: She is not toxic-appearing.   HENT:      Head: Normocephalic and atraumatic.   Eyes:      Extraocular Movements: Extraocular movements intact.      Conjunctiva/sclera: Conjunctivae normal.      Pupils: Pupils are equal, round, and reactive to light.   Neck:      Comments: ROM decreased  in all directions secondary to pain  Cardiovascular:      Rate and Rhythm: Normal rate and regular rhythm.   Pulmonary:      Effort: Pulmonary effort is normal.      Breath sounds: Normal breath sounds.   Musculoskeletal:      Cervical back: No tenderness.      Right lower leg: No edema.      Left lower leg: No edema.   Lymphadenopathy:      Cervical: No cervical adenopathy.   Neurological:      Mental Status: She is alert and oriented to person, place, and time.      Cranial Nerves: No cranial nerve deficit.      Sensory: No sensory deficit.      Gait: Gait normal.   Psychiatric:         Mood and Affect: Mood normal.         Behavior: Behavior normal.

## 2024-06-12 ENCOUNTER — OFFICE VISIT (OUTPATIENT)
Dept: PAIN MEDICINE | Facility: CLINIC | Age: 49
End: 2024-06-12
Payer: MEDICARE

## 2024-06-12 VITALS — WEIGHT: 190 LBS | BODY MASS INDEX: 29.82 KG/M2 | HEIGHT: 67 IN

## 2024-06-12 DIAGNOSIS — M47.817 LUMBOSACRAL SPONDYLOSIS WITHOUT MYELOPATHY: Primary | ICD-10-CM

## 2024-06-12 DIAGNOSIS — M46.1 SACROILIITIS (HCC): ICD-10-CM

## 2024-06-12 PROCEDURE — 99214 OFFICE O/P EST MOD 30 MIN: CPT | Performed by: ANESTHESIOLOGY

## 2024-06-12 PROCEDURE — G2211 COMPLEX E/M VISIT ADD ON: HCPCS | Performed by: ANESTHESIOLOGY

## 2024-06-12 NOTE — PROGRESS NOTES
Assessment:  1. Lumbosacral spondylosis without myelopathy    2. Sacroiliitis (HCC)        While the patient was in the office today, I did have a thorough conversation regarding their chronic pain syndrome, medication management, and treatment plan options.     The patient underwent repeat right sacroiliac joint injection on 4/25/24.  Unfortunately she did not obtain the same amount of relief that she did with her prior sacroiliac joint injections.     She does note that her pain is more in the lumbar area in a belt-like fashion, R>L She does have noted facet arthropathy at L4-5 and L5-S1 on lumbar MRI.    The patient has been experiencing moderate to severe axial spine pain that is causing functional deficit.  The pain has been present for at least 3 months and is not improving with conservative care.  Currently the patient is not experiencing any radicular features nor neurogenic claudication.  Non-facet pathology has been ruled out on clinical evaluation.    The patient's low back pain persists despite time, relative rest, activity modification and therapy. Based on the patient's symptoms examination, I suspect that the pain is being generated by the lumbar facet joints. The facet joints are only one of many possible low-back pain generators.     Plan:    We discussed moving in a stepwise fashion -I offered her diagnostic Right L3-5 lumbar medial branch blocks as the next treatment modality.     - We will schedule the patient for right L3-5 medial branch nerve blocks with intention of moving forward towards radiofrequency ablation if there is an appropriate diagnostic response. The initial blocks will be performed with 2% lidocaine and if an appropriate response is obtained upon review of the patient's pain diary, a confirmatory block will be scheduled with 0.5% bupivacaine.  In the office today, we reviewed the nature of facet joint pathology in depth using a spine model. We discussed the approach we would use  for the injections and provided literature for home review. The patient understands the risks associated with the procedure including bleeding, infection, tissue injury, and allergic reaction and provided verbal informed consent in the office today.    She is currently being treated for presumptive Lyme disease. She has a follow up with her PCP 7/3 to obtain more blood work to determine if she is clear of injection. If her blood work is wnl, we can proceed with MBB. Will schedule MBB for now as a placeholder.     - Discussed baclofen regimen - she will take 20mg nightly from now on. She does not take baclofen during the daytime. When she calls for refill, we will adjust her prescription for baclofen to 20mg nightly.     [place] Prescription Drug Monitoring Program report was reviewed and was appropriate      My impressions and treatment recommendations were discussed in detail with the patient who verbalized understanding and had no further questions.  Discharge instructions were provided. I personally saw and examined the patient and I agree with the above discussed plan of care.    Orders Placed This Encounter   Procedures    FL spine and pain procedure     Standing Status:   Future     Standing Expiration Date:   6/12/2028     Order Specific Question:   Reason for Exam:     Answer:   right L3-5 MBB#1     Order Specific Question:   Is the patient pregnant?     Answer:   No     Order Specific Question:   Anticoagulant hold needed?     Answer:   no     No orders of the defined types were placed in this encounter.      History of Present Illness:  Lissett Fountain is a 49 y.o. female who presents for a follow up office visit in regards to Back Pain.   The patient’s current symptoms include continued R>L lower back pain symptoms. Pain is rated 6/10 and described as an intermittent aching pain worse in the evening.    I have personally reviewed and/or updated the patient's past medical history, past surgical  history, family history, social history, current medications, allergies, and vital signs today.     Review of Systems   Constitutional:  Negative for chills and fever.   HENT:  Negative for ear pain and sore throat.    Eyes:  Negative for pain and visual disturbance.   Respiratory:  Negative for cough and shortness of breath.    Cardiovascular:  Negative for chest pain and palpitations.   Gastrointestinal:  Negative for abdominal pain and vomiting.   Genitourinary:  Negative for dysuria and hematuria.   Musculoskeletal:  Positive for back pain, gait problem and myalgias. Negative for arthralgias.   Skin:  Negative for color change and rash.   Neurological:  Negative for seizures and syncope.   All other systems reviewed and are negative.      Patient Active Problem List   Diagnosis    Bipolar 1 disorder (HCC)    Thyroid disease    Anxiety    Chronic pain syndrome    Lumbar radiculopathy    Foraminal stenosis of lumbar region    Sacroiliitis (HCC)       Past Medical History:   Diagnosis Date    Anxiety     Bipolar 1 disorder (HCC)     Depression     Disease of thyroid gland     Fibromyalgia, primary     GERD (gastroesophageal reflux disease) 2015    Inflammatory bowel disease     Thyroid disease        Past Surgical History:   Procedure Laterality Date    APPENDECTOMY      EPIDURAL BLOCK INJECTION  2017?    KNEE SURGERY      ORTHOPEDIC SURGERY  2008    TOTAL THYROIDECTOMY         Family History   Problem Relation Age of Onset    Cancer Mother     Depression Mother     Diabetes Mother     GI problems Mother     Hypertension Mother     Mental illness Mother     Migraines Mother     No Known Problems Father        Social History     Occupational History    Not on file   Tobacco Use    Smoking status: Every Day     Current packs/day: 0.50     Average packs/day: 0.5 packs/day for 20.0 years (10.0 ttl pk-yrs)     Types: Cigarettes    Smokeless tobacco: Never   Vaping Use    Vaping status: Never Used   Substance and Sexual  Activity    Alcohol use: Yes     Comment: socially     Drug use: No    Sexual activity: Yes     Partners: Male     Birth control/protection: None       Current Outpatient Medications on File Prior to Visit   Medication Sig    albuterol (VENTOLIN HFA) 90 mcg/act inhaler Inhale 2 puffs every 6 (six) hours as needed for wheezing    ARIPiprazole (ABILIFY) 5 mg tablet Take 5 mg by mouth daily    asenapine (SAPHRIS) 5 mg SL tablet Place 5 mg under the tongue daily at bedtime (Patient not taking: Reported on 6/23/2023)    baclofen 10 mg tablet TAKE 1 TABLET BY MOUTH THREE TIMES A DAY    benzonatate (TESSALON) 200 MG capsule Take 1 capsule (200 mg total) by mouth 3 (three) times a day as needed for cough (Patient not taking: Reported on 6/16/2023)    brompheniramine-pseudoephedrine-DM 30-2-10 MG/5ML syrup Take 10 mL by mouth 4 (four) times a day as needed for congestion or cough (Patient not taking: Reported on 10/10/2022)    buPROPion (WELLBUTRIN SR) 100 mg 12 hr tablet  (Patient not taking: Reported on 8/25/2023)    buPROPion (WELLBUTRIN SR) 100 mg 12 hr tablet Take 100 mg by mouth daily (Patient not taking: Reported on 6/16/2023)    busPIRone (BUSPAR) 15 mg tablet     clonazePAM (KlonoPIN) 0.5 mg tablet TAKE 0.5-1 TAB BY MOUTH TWICE A DAY AS NEEDED FOR ANXIETY    cycloSPORINE (RESTASIS) 0.05 % ophthalmic emulsion Administer 1 drop to both eyes 2 (two) times a day. (Patient not taking: Reported on 2/6/2023)    dicyclomine (BENTYL) 20 mg tablet Take 20 mg by mouth 3 (three) times a day (Patient not taking: Reported on 2/6/2023)    dicyclomine (BENTYL) 20 mg tablet Take 1 tablet (20 mg total) by mouth 2 (two) times a day (Patient not taking: Reported on 5/24/2024)    flecainide (TAMBOCOR) 100 mg tablet     flecainide (TAMBOCOR) 50 mg tablet 150 mg  (Patient not taking: Reported on 6/16/2023)    hydrOXYzine HCL (ATARAX) 25 mg tablet Take 25-50 mg by mouth every 6 (six) hours as needed (Patient not taking: Reported on  "10/10/2022)    hyoscyamine (LEVSIN/SL) 0.125 mg SL tablet Take 1 tablet (0.125 mg total) by mouth every 6 (six) hours as needed for cramping (Patient not taking: Reported on 3/20/2023)    levothyroxine 100 mcg tablet Take 112 mcg by mouth daily. (Patient not taking: Reported on 6/23/2023)    levothyroxine 137 mcg tablet  (Patient not taking: Reported on 10/5/2022)    Linzess 72 MCG CAPS Take 1 capsule by mouth daily    lithium 300 MG tablet     meloxicam (MOBIC) 7.5 mg tablet TAKE 1 TABLET BY MOUTH EVERY DAY    metoprolol succinate (TOPROL-XL) 25 mg 24 hr tablet Take 25 mg by mouth 2 (two) times a day    ondansetron (ZOFRAN) 4 mg tablet Take 1 tablet (4 mg total) by mouth every 6 (six) hours (Patient not taking: Reported on 5/24/2024)    pantoprazole (PROTONIX) 40 mg tablet Take 1 tablet by mouth daily    pregabalin (LYRICA) 200 MG capsule Take 1 capsule (200 mg total) by mouth 2 (two) times a day    topiramate (TOPAMAX) 100 mg tablet Take 100 mg by mouth 2 (two) times a day    venlafaxine (EFFEXOR-XR) 150 mg 24 hr capsule Take 150 mg by mouth daily. (Patient not taking: Reported on 6/23/2023)    venlafaxine (EFFEXOR-XR) 37.5 mg 24 hr capsule Take 37.5 mg by mouth daily. (Patient not taking: Reported on 6/23/2023)    venlafaxine (EFFEXOR-XR) 75 mg 24 hr capsule     ZOLMitriptan (ZOMIG) 5 MG tablet Take 5 mg by mouth once as needed for migraine May repeat in 2 hours if unresolved. Do not exceed 10 mg in 24 hours.     No current facility-administered medications on file prior to visit.       Allergies   Allergen Reactions    Pollen Extract        Physical Exam:    Ht 5' 7\" (1.702 m)   Wt 86.2 kg (190 lb)   BMI 29.76 kg/m²     Constitutional:normal, well developed, well nourished, alert, in no distress and non-toxic and no overt pain behavior.  Eyes:anicteric  HEENT:grossly intact  Neck:supple, symmetric, trachea midline and no masses   Pulmonary:even and unlabored  Cardiovascular:No edema or pitting edema " present  Skin:Normal without rashes or lesions and well hydrated  Psychiatric:Mood and affect appropriate  Neurologic: Motor function is grossly intact with no focal neurologic deficits   Musculoskeletal: Pain reproducible with lumbar extension and lateral flexion.2    Imaging

## 2024-07-03 DIAGNOSIS — G89.4 CHRONIC PAIN SYNDROME: ICD-10-CM

## 2024-07-03 RX ORDER — PREGABALIN 200 MG/1
200 CAPSULE ORAL 2 TIMES DAILY
Qty: 60 CAPSULE | Refills: 0 | Status: SHIPPED | OUTPATIENT
Start: 2024-07-03

## 2024-07-03 NOTE — TELEPHONE ENCOUNTER
Reason for call:   [x] Refill   [] Prior Auth  [] Other:     Office:   [] PCP/Provider -   [x] Specialty/Provider - [facility] / Brayden Dover MD     Medication:   pregabalin (LYRICA) 200 MG capsule -  Take 1 capsule (200 mg total) by mouth 2 (two) times a day,     Pharmacy:   [facility]/pharmacy #[id number] - [place] [address] -     Does the patient have enough for 3 days?   [x] Yes   [] No - Send as HP to POD

## 2024-07-03 NOTE — TELEPHONE ENCOUNTER
-- pt--    S/W pt.  Pt asking for refill as listed.  It helps her and has no side effects.  She has enough pills to Saturday am.  She stated call back is not needed once sent in.  Please advise.

## 2024-07-17 ENCOUNTER — TELEPHONE (OUTPATIENT)
Age: 49
End: 2024-07-17

## 2024-07-17 NOTE — TELEPHONE ENCOUNTER
Caller: Lissett     Doctor: Michelle     Reason for call: Patient called is currently taking Diflucan fr yeast infection and would like to know if she still can proceed with procedure please advise     Call back#: [phone]

## 2024-08-01 ENCOUNTER — TELEPHONE (OUTPATIENT)
Dept: RADIOLOGY | Facility: MEDICAL CENTER | Age: 49
End: 2024-08-01

## 2024-08-01 DIAGNOSIS — M47.816 LUMBAR SPONDYLOSIS: ICD-10-CM

## 2024-08-01 RX ORDER — MELOXICAM 7.5 MG/1
7.5 TABLET ORAL DAILY
Qty: 30 TABLET | Refills: 3 | Status: SHIPPED | OUTPATIENT
Start: 2024-08-01

## 2024-08-01 NOTE — TELEPHONE ENCOUNTER
Patient scheduled for 10:20am procedure 8/1 with Dr. Dover. Patient did not come to appt. Called and left message.

## 2024-08-05 DIAGNOSIS — G89.4 CHRONIC PAIN SYNDROME: ICD-10-CM

## 2024-08-05 RX ORDER — PREGABALIN 200 MG/1
200 CAPSULE ORAL 2 TIMES DAILY
Qty: 60 CAPSULE | Refills: 2 | Status: SHIPPED | OUTPATIENT
Start: 2024-08-05

## 2024-08-05 NOTE — TELEPHONE ENCOUNTER
Reason for call:   [x] Refill   [] Prior Auth  [] Other:     Office:   [] PCP/Provider -   [x] Specialty/Provider - [facility]     Medication:  pregabalin (LYRICA) 200 MG capsule    Dose/Frequency: Take 1 capsule (200 mg total) by mouth 2 (two) times a day    Quantity: 60    Pharmacy: [facility]/pharmacy #[id number] - [place] - [address] [place] [address]      Does the patient have enough for 3 days?   [] Yes   [x] No - Send as HP to POD

## 2024-08-05 NOTE — TELEPHONE ENCOUNTER
S/W pt.  Pt requesting refill as listed.  She stated it helps her and no side effects.  Pt stated C/B not needed once sent in. Please advise.

## 2024-10-08 ENCOUNTER — TELEPHONE (OUTPATIENT)
Age: 49
End: 2024-10-08

## 2024-10-08 NOTE — TELEPHONE ENCOUNTER
Caller: jose    Doctor: tia    Reason for call: pt would like to get set up for her injection.    Call back#: [phone]

## 2024-10-11 NOTE — TELEPHONE ENCOUNTER
Patients procedure and follow up is rescheduled.       Reviewed instructions: , NPO 1 hour prior, loose-fitting/comfortable clothes, if ill/fever/infx/abx to call and reschedule.  Also pain level at leat 5/10 and refrain from PRN, as-needed pain meds 6h prior.      Patient stated verbal understanding.

## 2024-10-31 ENCOUNTER — HOSPITAL ENCOUNTER (OUTPATIENT)
Dept: RADIOLOGY | Facility: MEDICAL CENTER | Age: 49
End: 2024-10-31
Payer: MEDICARE

## 2024-10-31 VITALS
TEMPERATURE: 97.9 F | DIASTOLIC BLOOD PRESSURE: 84 MMHG | RESPIRATION RATE: 20 BRPM | HEART RATE: 67 BPM | OXYGEN SATURATION: 94 % | SYSTOLIC BLOOD PRESSURE: 122 MMHG

## 2024-10-31 DIAGNOSIS — M47.817 LUMBOSACRAL SPONDYLOSIS WITHOUT MYELOPATHY: ICD-10-CM

## 2024-10-31 PROCEDURE — 64493 INJ PARAVERT F JNT L/S 1 LEV: CPT | Performed by: ANESTHESIOLOGY

## 2024-10-31 PROCEDURE — 64494 INJ PARAVERT F JNT L/S 2 LEV: CPT | Performed by: ANESTHESIOLOGY

## 2024-10-31 RX ADMIN — Medication 1.5 ML: at 10:22

## 2024-10-31 NOTE — DISCHARGE INSTRUCTIONS

## 2024-10-31 NOTE — H&P
History of Present Illness: The patient is a 49 y.o. female who presents with complaints of low back pain    Past Medical History:   Diagnosis Date    Anxiety     Bipolar 1 disorder (HCC)     Depression     Disease of thyroid gland     Fibromyalgia, primary     GERD (gastroesophageal reflux disease) 2015    Inflammatory bowel disease     Thyroid disease        Past Surgical History:   Procedure Laterality Date    APPENDECTOMY      EPIDURAL BLOCK INJECTION  2017?    KNEE SURGERY      ORTHOPEDIC SURGERY  2008    TOTAL THYROIDECTOMY           Current Outpatient Medications:     albuterol (VENTOLIN HFA) 90 mcg/act inhaler, Inhale 2 puffs every 6 (six) hours as needed for wheezing, Disp: 18 g, Rfl: 0    ARIPiprazole (ABILIFY) 5 mg tablet, Take 5 mg by mouth daily, Disp: , Rfl:     asenapine (SAPHRIS) 5 mg SL tablet, Place 5 mg under the tongue daily at bedtime (Patient not taking: Reported on 6/23/2023), Disp: , Rfl:     baclofen 10 mg tablet, TAKE 1 TABLET BY MOUTH THREE TIMES A DAY, Disp: 90 tablet, Rfl: 2    benzonatate (TESSALON) 200 MG capsule, Take 1 capsule (200 mg total) by mouth 3 (three) times a day as needed for cough (Patient not taking: Reported on 6/16/2023), Disp: 20 capsule, Rfl: 0    brompheniramine-pseudoephedrine-DM 30-2-10 MG/5ML syrup, Take 10 mL by mouth 4 (four) times a day as needed for congestion or cough (Patient not taking: Reported on 10/10/2022), Disp: 120 mL, Rfl: 0    buPROPion (WELLBUTRIN SR) 100 mg 12 hr tablet, , Disp: , Rfl:     buPROPion (WELLBUTRIN SR) 100 mg 12 hr tablet, Take 100 mg by mouth daily (Patient not taking: Reported on 6/16/2023), Disp: , Rfl:     busPIRone (BUSPAR) 15 mg tablet, , Disp: , Rfl:     clonazePAM (KlonoPIN) 0.5 mg tablet, TAKE 0.5-1 TAB BY MOUTH TWICE A DAY AS NEEDED FOR ANXIETY, Disp: , Rfl:     cycloSPORINE (RESTASIS) 0.05 % ophthalmic emulsion, Administer 1 drop to both eyes 2 (two) times a day. (Patient not taking: Reported on 2/6/2023), Disp: , Rfl:      dicyclomine (BENTYL) 20 mg tablet, Take 20 mg by mouth 3 (three) times a day (Patient not taking: Reported on 2/6/2023), Disp: , Rfl:     dicyclomine (BENTYL) 20 mg tablet, Take 1 tablet (20 mg total) by mouth 2 (two) times a day (Patient not taking: Reported on 5/24/2024), Disp: 20 tablet, Rfl: 0    flecainide (TAMBOCOR) 100 mg tablet, , Disp: , Rfl:     flecainide (TAMBOCOR) 50 mg tablet, 150 mg  (Patient not taking: Reported on 6/16/2023), Disp: , Rfl:     hydrOXYzine HCL (ATARAX) 25 mg tablet, Take 25-50 mg by mouth every 6 (six) hours as needed (Patient not taking: Reported on 10/10/2022), Disp: , Rfl:     hyoscyamine (LEVSIN/SL) 0.125 mg SL tablet, Take 1 tablet (0.125 mg total) by mouth every 6 (six) hours as needed for cramping (Patient not taking: Reported on 3/20/2023), Disp: 60 tablet, Rfl: 3    levothyroxine 100 mcg tablet, Take 112 mcg by mouth daily. (Patient not taking: Reported on 6/23/2023), Disp: , Rfl:     levothyroxine 137 mcg tablet, , Disp: , Rfl:     Linzess 72 MCG CAPS, Take 1 capsule by mouth daily, Disp: , Rfl:     lithium 300 MG tablet, , Disp: , Rfl:     meloxicam (MOBIC) 7.5 mg tablet, TAKE 1 TABLET BY MOUTH EVERY DAY, Disp: 30 tablet, Rfl: 3    metoprolol succinate (TOPROL-XL) 25 mg 24 hr tablet, Take 25 mg by mouth 2 (two) times a day, Disp: , Rfl:     ondansetron (ZOFRAN) 4 mg tablet, Take 1 tablet (4 mg total) by mouth every 6 (six) hours (Patient not taking: Reported on 5/24/2024), Disp: 12 tablet, Rfl: 0    pantoprazole (PROTONIX) 40 mg tablet, Take 1 tablet by mouth daily, Disp: , Rfl:     pregabalin (LYRICA) 200 MG capsule, Take 1 capsule (200 mg total) by mouth 2 (two) times a day, Disp: 60 capsule, Rfl: 2    topiramate (TOPAMAX) 100 mg tablet, Take 100 mg by mouth 2 (two) times a day, Disp: , Rfl:     venlafaxine (EFFEXOR-XR) 150 mg 24 hr capsule, Take 150 mg by mouth daily. (Patient not taking: Reported on 6/23/2023), Disp: , Rfl:     venlafaxine (EFFEXOR-XR) 37.5 mg 24 hr  capsule, Take 37.5 mg by mouth daily. (Patient not taking: Reported on 6/23/2023), Disp: , Rfl:     venlafaxine (EFFEXOR-XR) 75 mg 24 hr capsule, , Disp: , Rfl:     ZOLMitriptan (ZOMIG) 5 MG tablet, Take 5 mg by mouth once as needed for migraine May repeat in 2 hours if unresolved. Do not exceed 10 mg in 24 hours., Disp: , Rfl:     Current Facility-Administered Medications:     lidocaine (PF) (XYLOCAINE-MPF) 2 % injection 1.5 mL, 1.5 mL, Perineural, Once, Will Adeline Dover MD    Allergies   Allergen Reactions    Pollen Extract        Physical Exam:   There were no vitals filed for this visit.    General: Awake, Alert, Oriented x 3, Mood and affect appropriate  Respiratory: Respirations even and unlabored  Cardiovascular: Peripheral pulses intact; no edema  Musculoskeletal Exam: back pain    ASA Score: 2         Assessment:   1. Lumbosacral spondylosis without myelopathy        Plan: right L3-5 MBB#1

## 2024-11-05 ENCOUNTER — OFFICE VISIT (OUTPATIENT)
Dept: PAIN MEDICINE | Facility: CLINIC | Age: 49
End: 2024-11-05
Payer: MEDICARE

## 2024-11-05 VITALS — WEIGHT: 190 LBS | BODY MASS INDEX: 29.82 KG/M2 | HEIGHT: 67 IN

## 2024-11-05 DIAGNOSIS — M54.50 CHRONIC BILATERAL LOW BACK PAIN, UNSPECIFIED WHETHER SCIATICA PRESENT: Primary | ICD-10-CM

## 2024-11-05 DIAGNOSIS — M47.817 LUMBOSACRAL SPONDYLOSIS WITHOUT MYELOPATHY: ICD-10-CM

## 2024-11-05 DIAGNOSIS — G89.4 CHRONIC PAIN SYNDROME: ICD-10-CM

## 2024-11-05 DIAGNOSIS — G89.29 CHRONIC BILATERAL LOW BACK PAIN, UNSPECIFIED WHETHER SCIATICA PRESENT: Primary | ICD-10-CM

## 2024-11-05 PROCEDURE — G2211 COMPLEX E/M VISIT ADD ON: HCPCS | Performed by: ANESTHESIOLOGY

## 2024-11-05 PROCEDURE — 99214 OFFICE O/P EST MOD 30 MIN: CPT | Performed by: ANESTHESIOLOGY

## 2024-11-05 RX ORDER — PREGABALIN 200 MG/1
200 CAPSULE ORAL 2 TIMES DAILY
Qty: 60 CAPSULE | Refills: 2 | Status: SHIPPED | OUTPATIENT
Start: 2024-11-05

## 2024-11-05 RX ORDER — ALPRAZOLAM 0.5 MG
TABLET ORAL
Qty: 1 TABLET | Refills: 0 | Status: SHIPPED | OUTPATIENT
Start: 2024-11-05

## 2024-11-05 NOTE — PROGRESS NOTES
Assessment:  1. Chronic bilateral low back pain, unspecified whether sciatica present    2. Chronic pain syndrome    3. Lumbosacral spondylosis without myelopathy      While the patient was in the office today, I did have a thorough conversation regarding their chronic pain syndrome, medication management, and treatment plan options.     The patient underwent repeat right sacroiliac joint injection on 4/25/24.  Unfortunately she did not obtain the same amount of relief that she did with her prior sacroiliac joint injections.     She does note that her pain is more in the lumbar area in a belt-like fashion, R>L She does have noted facet arthropathy at L4-5 and L5-S1 on lumbar MRI.     The patient has been experiencing moderate to severe axial spine pain that is causing functional deficit.  The pain has been present for at least 3 months and is not improving with conservative care.  Currently the patient is not experiencing any radicular features nor neurogenic claudication.  Non-facet pathology has been ruled out on clinical evaluation.     The patient's low back pain persists despite time, relative rest, activity modification and therapy. Based on the patient's symptoms examination, I suspect that the pain is being generated by the lumbar facet joints. The facet joints are only one of many possible low-back pain generators.      Plan:     After Right L3-5 lumbar medial branch blocks with 2% lidocaine, her pain reduced from 6/10 to 0/10 for 3 hours and lasted the duration of the LA. Her pain has now returned to baseline 6-7/10 axial back pain.    We will schedule the patient for confirmatory right L3-5 medial branch nerve blocks with intention of moving forward towards radiofrequency ablation if there is a second appropriate diagnostic response. The confirmatory block will be scheduled with 0.5% bupivacaine.    The patient understands the risks associated with the procedure including bleeding, infection, tissue  injury, and allergic reaction and provided verbal informed consent in the office today.     Will continue Lyrica 200mg BID - refills provided today.    Xanax 1mg sent to pharmacy for preprocedual anxiety.    [place] Prescription Drug Monitoring Program report was reviewed and was appropriate      My impressions and treatment recommendations were discussed in detail with the patient who verbalized understanding and had no further questions.  Discharge instructions were provided. I personally saw and examined the patient and I agree with the above discussed plan of care.    Orders Placed This Encounter   Procedures    FL spine and pain procedure     Standing Status:   Future     Standing Expiration Date:   11/5/2028     Order Specific Question:   Reason for Exam:     Answer:   right L3-5 MBB#2     Order Specific Question:   Is the patient pregnant?     Answer:   No     Order Specific Question:   Anticoagulant hold needed?     Answer:   no     New Medications Ordered This Visit   Medications    ALPRAZolam (XANAX) 0.5 mg tablet     Sig: Take 1mg 45 min before procedure for anxiety     Dispense:  1 tablet     Refill:  0    pregabalin (LYRICA) 200 MG capsule     Sig: Take 1 capsule (200 mg total) by mouth 2 (two) times a day     Dispense:  60 capsule     Refill:  2       History of Present Illness:  Lissett Fountain is a 49 y.o. female who presents for a follow up office visit in regards to Back Pain. The patient’s current symptoms include continued R>L lower back pain symptoms. Pain is rated 6/10 and described as an intermittent aching pain worse in the evening.    I have personally reviewed and/or updated the patient's past medical history, past surgical history, family history, social history, current medications, allergies, and vital signs today.     Review of Systems   Constitutional:  Negative for chills and fever.   HENT:  Negative for ear pain and sore throat.    Eyes:  Negative for pain and visual disturbance.    Respiratory:  Negative for cough and shortness of breath.    Cardiovascular:  Negative for chest pain and palpitations.   Gastrointestinal:  Negative for abdominal pain and vomiting.   Genitourinary:  Negative for dysuria and hematuria.   Musculoskeletal:  Positive for back pain, gait problem and myalgias. Negative for arthralgias.   Skin:  Negative for color change and rash.   Neurological:  Negative for seizures and syncope.   All other systems reviewed and are negative.      Patient Active Problem List   Diagnosis    Bipolar 1 disorder (HCC)    Thyroid disease    Anxiety    Chronic pain syndrome    Lumbar radiculopathy    Foraminal stenosis of lumbar region    Sacroiliitis (HCC)    Lumbosacral spondylosis without myelopathy       Past Medical History:   Diagnosis Date    Anxiety     Bipolar 1 disorder (HCC)     Depression     Disease of thyroid gland     Fibromyalgia, primary     GERD (gastroesophageal reflux disease) 2015    Inflammatory bowel disease     Thyroid disease        Past Surgical History:   Procedure Laterality Date    APPENDECTOMY      EPIDURAL BLOCK INJECTION  2017?    KNEE SURGERY      ORTHOPEDIC SURGERY  2008    TOTAL THYROIDECTOMY         Family History   Problem Relation Age of Onset    Cancer Mother     Depression Mother     Diabetes Mother     GI problems Mother     Hypertension Mother     Mental illness Mother     Migraines Mother     No Known Problems Father        Social History     Occupational History    Not on file   Tobacco Use    Smoking status: Every Day     Current packs/day: 0.50     Average packs/day: 0.5 packs/day for 20.0 years (10.0 ttl pk-yrs)     Types: Cigarettes    Smokeless tobacco: Never   Vaping Use    Vaping status: Never Used   Substance and Sexual Activity    Alcohol use: Yes     Comment: socially     Drug use: No    Sexual activity: Yes     Partners: Male     Birth control/protection: None       Current Outpatient Medications on File Prior to Visit   Medication Sig     albuterol (VENTOLIN HFA) 90 mcg/act inhaler Inhale 2 puffs every 6 (six) hours as needed for wheezing    baclofen 10 mg tablet TAKE 1 TABLET BY MOUTH THREE TIMES A DAY    busPIRone (BUSPAR) 15 mg tablet     clonazePAM (KlonoPIN) 0.5 mg tablet TAKE 0.5-1 TAB BY MOUTH TWICE A DAY AS NEEDED FOR ANXIETY    flecainide (TAMBOCOR) 100 mg tablet     Linzess 72 MCG CAPS Take 1 capsule by mouth daily    lithium 300 MG tablet     meloxicam (MOBIC) 7.5 mg tablet TAKE 1 TABLET BY MOUTH EVERY DAY    pantoprazole (PROTONIX) 40 mg tablet Take 1 tablet by mouth daily    topiramate (TOPAMAX) 100 mg tablet Take 100 mg by mouth 2 (two) times a day    venlafaxine (EFFEXOR-XR) 75 mg 24 hr capsule     ZOLMitriptan (ZOMIG) 5 MG tablet Take 5 mg by mouth once as needed for migraine May repeat in 2 hours if unresolved. Do not exceed 10 mg in 24 hours.    [DISCONTINUED] pregabalin (LYRICA) 200 MG capsule Take 1 capsule (200 mg total) by mouth 2 (two) times a day    ARIPiprazole (ABILIFY) 5 mg tablet Take 5 mg by mouth daily    asenapine (SAPHRIS) 5 mg SL tablet Place 5 mg under the tongue daily at bedtime (Patient not taking: Reported on 6/23/2023)    benzonatate (TESSALON) 200 MG capsule Take 1 capsule (200 mg total) by mouth 3 (three) times a day as needed for cough (Patient not taking: Reported on 6/16/2023)    brompheniramine-pseudoephedrine-DM 30-2-10 MG/5ML syrup Take 10 mL by mouth 4 (four) times a day as needed for congestion or cough (Patient not taking: Reported on 10/10/2022)    buPROPion (WELLBUTRIN SR) 100 mg 12 hr tablet  (Patient not taking: Reported on 8/25/2023)    buPROPion (WELLBUTRIN SR) 100 mg 12 hr tablet Take 100 mg by mouth daily (Patient not taking: Reported on 6/16/2023)    cycloSPORINE (RESTASIS) 0.05 % ophthalmic emulsion Administer 1 drop to both eyes 2 (two) times a day. (Patient not taking: Reported on 2/6/2023)    dicyclomine (BENTYL) 20 mg tablet Take 20 mg by mouth 3 (three) times a day (Patient not  "taking: Reported on 2/6/2023)    dicyclomine (BENTYL) 20 mg tablet Take 1 tablet (20 mg total) by mouth 2 (two) times a day (Patient not taking: Reported on 5/24/2024)    flecainide (TAMBOCOR) 50 mg tablet 150 mg  (Patient not taking: Reported on 6/16/2023)    hydrOXYzine HCL (ATARAX) 25 mg tablet Take 25-50 mg by mouth every 6 (six) hours as needed (Patient not taking: Reported on 10/10/2022)    hyoscyamine (LEVSIN/SL) 0.125 mg SL tablet Take 1 tablet (0.125 mg total) by mouth every 6 (six) hours as needed for cramping (Patient not taking: Reported on 3/20/2023)    levothyroxine 100 mcg tablet Take 112 mcg by mouth daily. (Patient not taking: Reported on 6/23/2023)    levothyroxine 137 mcg tablet  (Patient not taking: Reported on 10/5/2022)    metoprolol succinate (TOPROL-XL) 25 mg 24 hr tablet Take 25 mg by mouth 2 (two) times a day    ondansetron (ZOFRAN) 4 mg tablet Take 1 tablet (4 mg total) by mouth every 6 (six) hours (Patient not taking: Reported on 5/24/2024)    venlafaxine (EFFEXOR-XR) 150 mg 24 hr capsule Take 150 mg by mouth daily. (Patient not taking: Reported on 6/23/2023)    venlafaxine (EFFEXOR-XR) 37.5 mg 24 hr capsule Take 37.5 mg by mouth daily. (Patient not taking: Reported on 6/23/2023)     No current facility-administered medications on file prior to visit.       Allergies   Allergen Reactions    Pollen Extract        Physical Exam:    Ht 5' 7\" (1.702 m)   Wt 86.2 kg (190 lb)   BMI 29.76 kg/m²     Constitutional:normal, well developed, well nourished, alert, in no distress and non-toxic and no overt pain behavior.  Eyes:anicteric  HEENT:grossly intact  Neck:supple, symmetric, trachea midline and no masses   Pulmonary:even and unlabored  Cardiovascular:No edema or pitting edema present  Skin:Normal without rashes or lesions and well hydrated  Psychiatric:Mood and affect appropriate  Neurologic: Motor function is grossly intact with no focal neurologic deficits   Musculoskeletal: Pain elicited " with right lumbar extension and lateral flexion    Imaging

## 2024-11-11 ENCOUNTER — TELEPHONE (OUTPATIENT)
Age: 49
End: 2024-11-11

## 2024-11-11 DIAGNOSIS — G89.29 CHRONIC BILATERAL LOW BACK PAIN, UNSPECIFIED WHETHER SCIATICA PRESENT: Primary | ICD-10-CM

## 2024-11-11 DIAGNOSIS — M54.50 CHRONIC BILATERAL LOW BACK PAIN, UNSPECIFIED WHETHER SCIATICA PRESENT: Primary | ICD-10-CM

## 2024-11-11 NOTE — TELEPHONE ENCOUNTER
S/W pt.  Pt has right lower back pain 7/10.  MBB #2 needs to be scheduled.  She is taking Aleve 2 pills 2x/day which is not helping.  Lidocaine patches take the edge off a little.  She is taking Lyrica as ordered.  She stated the pain is interfering with her every day activities.  Pt asking if something else can be ordered for the pain and/or if the Lyrica can be increased?  She stated tylenol does not help.  Please advise.

## 2024-11-11 NOTE — TELEPHONE ENCOUNTER
Caller: Lissett     Doctor: Jazzmine     Reason for call: Patient calling stating constant pain 7/10 pain level and needs to know next steps please advise     Call back#: [phone]

## 2024-11-12 RX ORDER — CYCLOBENZAPRINE HCL 10 MG
10 TABLET ORAL 2 TIMES DAILY PRN
Qty: 60 TABLET | Refills: 0 | Status: SHIPPED | OUTPATIENT
Start: 2024-11-12

## 2024-11-12 NOTE — TELEPHONE ENCOUNTER
S/w pt and advised of same. Pt advised not to drive or operate machinery until she knows how the medication will make her feel. Pt also advised to stop taking Baclofen. Pt verbalized understanding.

## 2024-11-26 NOTE — TELEPHONE ENCOUNTER
S/W pt who reports she has 2 pills left, last ovs 1/4 lyrica was increased to 200mg BID, pt has noticed some improvement, denies side effects  Next ovs is 4/19 with [facility]  Pt reports she will get notified from [facility] when script is ready  Please advise  Dr. New

## 2024-12-02 ENCOUNTER — TELEPHONE (OUTPATIENT)
Dept: OBGYN CLINIC | Facility: HOSPITAL | Age: 49
End: 2024-12-02

## 2024-12-02 NOTE — TELEPHONE ENCOUNTER
Lm for pt to call back to reschedule her new patient appointment due to Dr. Santiago being OOO. Advised pt we would cancelled her appointment until she called back. Provided call back number for pt.     Did offer pt an appointment on 12/3 in the afternoon. If this works for the pt please send a force on request to me. If not please schedule the pt in the next available spot.

## 2025-01-22 ENCOUNTER — TELEPHONE (OUTPATIENT)
Age: 50
End: 2025-01-22

## 2025-01-22 DIAGNOSIS — M47.817 LUMBOSACRAL SPONDYLOSIS WITHOUT MYELOPATHY: Primary | ICD-10-CM

## 2025-01-22 DIAGNOSIS — G89.29 CHRONIC BILATERAL LOW BACK PAIN, UNSPECIFIED WHETHER SCIATICA PRESENT: ICD-10-CM

## 2025-01-22 DIAGNOSIS — M79.18 MYOFASCIAL PAIN SYNDROME: ICD-10-CM

## 2025-01-22 DIAGNOSIS — G89.4 CHRONIC PAIN SYNDROME: ICD-10-CM

## 2025-01-22 DIAGNOSIS — M54.50 CHRONIC BILATERAL LOW BACK PAIN, UNSPECIFIED WHETHER SCIATICA PRESENT: ICD-10-CM

## 2025-01-22 RX ORDER — CYCLOBENZAPRINE HCL 10 MG
10 TABLET ORAL 2 TIMES DAILY PRN
Qty: 60 TABLET | Refills: 2 | Status: SHIPPED | OUTPATIENT
Start: 2025-01-22

## 2025-01-22 RX ORDER — PREGABALIN 200 MG/1
200 CAPSULE ORAL 2 TIMES DAILY
Qty: 60 CAPSULE | Refills: 2 | Status: SHIPPED | OUTPATIENT
Start: 2025-01-22

## 2025-01-22 NOTE — TELEPHONE ENCOUNTER
Caller: Patient    Doctor: Jazzmine    Reason for call: Patient would like to know her next plan of care, patient is having lower right back pain,   Would she need a OV to get a refill for her Lyrica, Would like to know if she is able to stop taking her baclofen and state taking Flexeril    Please advise    Call back#:

## 2025-01-22 NOTE — TELEPHONE ENCOUNTER
S/W patient regarding request for refills on Lyrica 200mg BID - states she is taking as prescribed and it is effective with no side effects noted.      Also requested a refill on Cyclobenzaprine 10mg BID PRN.  - states she takes daily in the PM and occasionally also in the AM as well.  It is effective and has not reported any side effects.     She would like to know what the next steps in treatment are as she had MBB#1 and Medicaid was dropped before she could receive MBB#2.  She is not able to afford the cost difference that medicare would not cover.  She is looking for a payment plan or another course of treatment.  Please advise

## 2025-01-22 NOTE — TELEPHONE ENCOUNTER
Reason for call:   [x] Refill   [] Prior Auth  [] Other:     Office:   [x] PCP/Provider -   [] Specialty/Provider -     LYRICA - 200 MG  FLEXERIL 10 MG    [id number] [place] [facility] [place] [address] [place] [id number]  Phone: [phone]  Fax: [phone]     Does the patient have enough for 3 days?   [x] Yes   [] No - Send as HP to POD

## 2025-05-05 DIAGNOSIS — G89.29 CHRONIC BILATERAL LOW BACK PAIN, UNSPECIFIED WHETHER SCIATICA PRESENT: ICD-10-CM

## 2025-05-05 DIAGNOSIS — M54.50 CHRONIC BILATERAL LOW BACK PAIN, UNSPECIFIED WHETHER SCIATICA PRESENT: ICD-10-CM

## 2025-05-05 RX ORDER — CYCLOBENZAPRINE HCL 10 MG
10 TABLET ORAL 2 TIMES DAILY PRN
Qty: 60 TABLET | Refills: 2 | Status: SHIPPED | OUTPATIENT
Start: 2025-05-05

## 2025-05-05 NOTE — TELEPHONE ENCOUNTER
Reason for call:   [x] Refill   [] Prior Auth  [] Other:     Office:   [] PCP/Provider -   [x] Specialty/Provider -  Will Adeline Dover MD    Medication: pregabalin (LYRICA) 200 MG capsule     Dose/Frequency: Take 1 capsule (200 mg total) by mouth 2 (two) times a day     Quantity:  60 capsule     Pharmacy: [facility]/pharmacy #[id number]     Local Pharmacy   Does the patient have enough for 3 days?   [] Yes   [x] No - Send as HP to POD    Mail Away Pharmacy   Does the patient have enough for 10 days?   [] Yes   [] No - Send as HP to POD

## 2025-05-05 NOTE — TELEPHONE ENCOUNTER
S/W pt.  Pt requesting refill as listed.  It helps her and denies side effects.  She has enough pills for tomorrow.  Pt stated C/B not needed once sent in.  Scheduled pt for SOVS on 5/21 at 10:30.  Please advise.

## 2025-05-07 DIAGNOSIS — G89.4 CHRONIC PAIN SYNDROME: ICD-10-CM

## 2025-05-07 RX ORDER — PREGABALIN 200 MG/1
200 CAPSULE ORAL 2 TIMES DAILY
Qty: 60 CAPSULE | Refills: 2 | Status: SHIPPED | OUTPATIENT
Start: 2025-05-07

## 2025-05-07 NOTE — TELEPHONE ENCOUNTER
Caller: Patient     Doctor/Office: Dr Dover     Call regarding :  Returning call regarding medication      Call was transferred to: Triage nurse

## 2025-05-07 NOTE — TELEPHONE ENCOUNTER
Reason for call:   [x] Refill   [] Prior Auth  [] Other:     Office:   [] PCP/Provider -   [x] Specialty/Provider -     Medication:   pregabalin (LYRICA) 200 MG        Dose/Frequency:  Take 1 capsule (200 mg total) by mouth 2 (two) times a day     Quantity: 60    Pharmacy: [facility]/pharmacy #[id number] - [place] - [address] [place] [address] Pharmacy   Does the patient have enough for 3 days?   [] Yes   [x] No - Send as HP to POD    Mail Away Pharmacy   Does the patient have enough for 10 days?   [] Yes   [] No - Send as HP to POD

## 2025-05-07 NOTE — TELEPHONE ENCOUNTER
Attempted to reach pt regarding previous refill request for Lyrica 200 mg bid. VMMLOM with CB# and [place].    Med last ordered 1/2025 with 2 refills.

## 2025-05-07 NOTE — TELEPHONE ENCOUNTER
Please see previous refill request for Lyrica 200 mg bid. Pt denies side effects and is helping. Pt has follow up ov on 5/21. Pt is out of medication. Pt advised she called Monday as well.

## 2025-05-21 ENCOUNTER — OFFICE VISIT (OUTPATIENT)
Dept: PAIN MEDICINE | Facility: CLINIC | Age: 50
End: 2025-05-21
Payer: MEDICARE

## 2025-05-21 VITALS — BODY MASS INDEX: 29.82 KG/M2 | WEIGHT: 190 LBS | HEIGHT: 67 IN

## 2025-05-21 DIAGNOSIS — M47.817 LUMBOSACRAL SPONDYLOSIS WITHOUT MYELOPATHY: Primary | ICD-10-CM

## 2025-05-21 PROCEDURE — 99214 OFFICE O/P EST MOD 30 MIN: CPT | Performed by: ANESTHESIOLOGY

## 2025-05-21 PROCEDURE — G2211 COMPLEX E/M VISIT ADD ON: HCPCS | Performed by: ANESTHESIOLOGY

## 2025-05-21 RX ORDER — GABAPENTIN 600 MG/1
600 TABLET ORAL 3 TIMES DAILY
Qty: 90 TABLET | Refills: 1 | Status: SHIPPED | OUTPATIENT
Start: 2025-05-21

## 2025-05-21 NOTE — PROGRESS NOTES
Assessment:  1. Lumbosacral spondylosis without myelopathy      While the patient was in the office today, I did have a thorough conversation regarding their chronic pain syndrome, medication management, and treatment plan options.     Repeat right sacroiliac joint injection on 4/25/24 did not given the same amount of relief that she did with her prior sacroiliac joint injections.     She does note that her pain is more in the lumbar area in a belt-like fashion, R>L She does have noted facet arthropathy at L4-5 and L5-S1 on lumbar MRI.     The patient has been experiencing moderate to severe axial spine pain that is causing functional deficit.  The pain has been present for at least 3 months and is not improving with conservative care.  Currently the patient is not experiencing any radicular features nor neurogenic claudication.  Non-facet pathology has been ruled out on clinical evaluation.     The patient's low back pain persists despite time, relative rest, activity modification and therapy. Based on the patient's symptoms examination, I suspect that the pain is being generated by the lumbar facet joints. The facet joints are only one of many possible low-back pain generators.      Plan:     After Right L3-5 lumbar medial branch blocks with 2% lidocaine, her pain reduced from 6/10 to 0/10 for 3 hours and lasted the duration of the LA. Her pain has now returned to baseline 6-7/10 axial back pain.    We will schedule the patient for confirmatory right L3-5 medial branch nerve blocks with intention of moving forward towards radiofrequency ablation if there is a second appropriate diagnostic response. The confirmatory block will be scheduled with 0.5% bupivacaine.    The patient understands the risks associated with the procedure including bleeding, infection, tissue injury, and allergic reaction and provided verbal informed consent in the office today.     Lyrica 200mg BID is providing modest benefit - will switch  to gabapentin 600mg TID as a trial.    My impressions and treatment recommendations were discussed in detail with the patient who verbalized understanding and had no further questions.  Discharge instructions were provided. I personally saw and examined the patient and I agree with the above discussed plan of care.    Orders Placed This Encounter   Procedures    FL spine and pain procedure     Standing Status:   Future     Expected Date:   5/21/2025     Expiration Date:   5/21/2029     Reason for Exam::   right L3-5 MBB #2     Is the patient pregnant?:   No     Is an anticoagulant hold needed?:   no     New Medications Ordered This Visit   Medications    gabapentin (Neurontin) 600 MG tablet     Sig: Take 1 tablet (600 mg total) by mouth 3 (three) times a day     Dispense:  90 tablet     Refill:  1       History of Present Illness:  Lissett Fountain is a 50 y.o. female who presents for a follow up office visit in regards to Back Pain. The patient’s current symptoms include continued R>L lower back pain symptoms. Pain is rated 6-7/10 and described as an constant dull,aching, sharp, burning, shooting pain.    I have personally reviewed and/or updated the patient's past medical history, past surgical history, family history, social history, current medications, allergies, and vital signs today.     Review of Systems   Constitutional:  Negative for chills and fever.   HENT:  Negative for ear pain and sore throat.    Eyes:  Negative for pain and visual disturbance.   Respiratory:  Negative for cough and shortness of breath.    Cardiovascular:  Negative for chest pain and palpitations.   Gastrointestinal:  Negative for abdominal pain and vomiting.   Genitourinary:  Negative for dysuria and hematuria.   Musculoskeletal:  Positive for back pain, gait problem and myalgias. Negative for arthralgias.   Skin:  Negative for color change and rash.   Neurological:  Negative for seizures and syncope.   All other systems reviewed and  are negative.      Patient Active Problem List   Diagnosis    Bipolar 1 disorder (HCC)    Thyroid disease    Anxiety    Chronic pain syndrome    Lumbar radiculopathy    Foraminal stenosis of lumbar region    Sacroiliitis (HCC)    Lumbosacral spondylosis without myelopathy       Past Medical History:   Diagnosis Date    Anxiety     Bipolar 1 disorder (HCC)     Depression     Disease of thyroid gland     Fibromyalgia, primary     GERD (gastroesophageal reflux disease) 2015    Inflammatory bowel disease     Thyroid disease        Past Surgical History:   Procedure Laterality Date    APPENDECTOMY      EPIDURAL BLOCK INJECTION  2017?    KNEE SURGERY      ORTHOPEDIC SURGERY  2008    TOTAL THYROIDECTOMY         Family History   Problem Relation Age of Onset    Cancer Mother     Depression Mother     Diabetes Mother     GI problems Mother     Hypertension Mother     Mental illness Mother     Migraines Mother     No Known Problems Father        Social History     Occupational History    Not on file   Tobacco Use    Smoking status: Every Day     Current packs/day: 0.50     Average packs/day: 0.5 packs/day for 20.0 years (10.0 ttl pk-yrs)     Types: Cigarettes    Smokeless tobacco: Never   Vaping Use    Vaping status: Never Used   Substance and Sexual Activity    Alcohol use: Yes     Comment: socially     Drug use: No    Sexual activity: Yes     Partners: Male     Birth control/protection: None       Current Outpatient Medications on File Prior to Visit   Medication Sig    albuterol (VENTOLIN HFA) 90 mcg/act inhaler Inhale 2 puffs every 6 (six) hours as needed for wheezing    ALPRAZolam (XANAX) 0.5 mg tablet Take 1mg 45 min before procedure for anxiety    ARIPiprazole (ABILIFY) 5 mg tablet Take 5 mg by mouth daily    asenapine (SAPHRIS) 5 mg SL tablet Place 5 mg under the tongue daily at bedtime (Patient not taking: Reported on 6/23/2023)    baclofen 10 mg tablet TAKE 1 TABLET BY MOUTH THREE TIMES A DAY    benzonatate  (TESSALON) 200 MG capsule Take 1 capsule (200 mg total) by mouth 3 (three) times a day as needed for cough (Patient not taking: Reported on 6/16/2023)    brompheniramine-pseudoephedrine-DM 30-2-10 MG/5ML syrup Take 10 mL by mouth 4 (four) times a day as needed for congestion or cough (Patient not taking: Reported on 10/10/2022)    buPROPion (WELLBUTRIN SR) 100 mg 12 hr tablet  (Patient not taking: Reported on 8/25/2023)    busPIRone (BUSPAR) 15 mg tablet     clonazePAM (KlonoPIN) 0.5 mg tablet TAKE 0.5-1 TAB BY MOUTH TWICE A DAY AS NEEDED FOR ANXIETY    cyclobenzaprine (FLEXERIL) 10 mg tablet Take 1 tablet (10 mg total) by mouth 2 (two) times a day as needed for muscle spasms    cycloSPORINE (RESTASIS) 0.05 % ophthalmic emulsion Administer 1 drop to both eyes 2 (two) times a day. (Patient not taking: Reported on 2/6/2023)    dicyclomine (BENTYL) 20 mg tablet Take 20 mg by mouth 3 (three) times a day (Patient not taking: Reported on 2/6/2023)    dicyclomine (BENTYL) 20 mg tablet Take 1 tablet (20 mg total) by mouth 2 (two) times a day (Patient not taking: Reported on 5/24/2024)    flecainide (TAMBOCOR) 100 mg tablet     flecainide (TAMBOCOR) 50 mg tablet 150 mg  (Patient not taking: Reported on 6/16/2023)    hydrOXYzine HCL (ATARAX) 25 mg tablet Take 25-50 mg by mouth every 6 (six) hours as needed (Patient not taking: Reported on 10/10/2022)    hyoscyamine (LEVSIN/SL) 0.125 mg SL tablet Take 1 tablet (0.125 mg total) by mouth every 6 (six) hours as needed for cramping (Patient not taking: Reported on 3/20/2023)    levothyroxine 100 mcg tablet Take 112 mcg by mouth daily. (Patient not taking: Reported on 6/23/2023)    levothyroxine 137 mcg tablet  (Patient not taking: Reported on 10/5/2022)    Linzess 72 MCG CAPS Take 1 capsule by mouth daily    lithium 300 MG tablet     meloxicam (MOBIC) 7.5 mg tablet TAKE 1 TABLET BY MOUTH EVERY DAY    metoprolol succinate (TOPROL-XL) 25 mg 24 hr tablet Take 25 mg by mouth 2 (two)  "times a day    ondansetron (ZOFRAN) 4 mg tablet Take 1 tablet (4 mg total) by mouth every 6 (six) hours (Patient not taking: Reported on 5/24/2024)    pantoprazole (PROTONIX) 40 mg tablet Take 1 tablet by mouth daily    topiramate (TOPAMAX) 100 mg tablet Take 100 mg by mouth 2 (two) times a day    venlafaxine (EFFEXOR-XR) 150 mg 24 hr capsule Take 150 mg by mouth daily. (Patient not taking: Reported on 6/23/2023)    venlafaxine (EFFEXOR-XR) 37.5 mg 24 hr capsule Take 37.5 mg by mouth daily. (Patient not taking: Reported on 6/23/2023)    venlafaxine (EFFEXOR-XR) 75 mg 24 hr capsule     ZOLMitriptan (ZOMIG) 5 MG tablet Take 5 mg by mouth once as needed for migraine May repeat in 2 hours if unresolved. Do not exceed 10 mg in 24 hours.    [DISCONTINUED] pregabalin (LYRICA) 200 MG capsule Take 1 capsule (200 mg total) by mouth 2 (two) times a day     No current facility-administered medications on file prior to visit.       Allergies   Allergen Reactions    Pollen Extract        Physical Exam:    Ht 5' 7\" (1.702 m)   Wt 86.2 kg (190 lb)   BMI 29.76 kg/m²     Constitutional:normal, well developed, well nourished, alert, in no distress and non-toxic and no overt pain behavior.  Eyes:anicteric  HEENT:grossly intact  Neck:supple, symmetric, trachea midline and no masses   Pulmonary:even and unlabored  Cardiovascular:No edema or pitting edema present  Skin:Normal without rashes or lesions and well hydrated  Psychiatric:Mood and affect appropriate  Neurologic: Motor function is grossly intact with no focal neurologic deficits   Musculoskeletal: Pain elicited with right lumbar extension and lateral flexion    Imaging    "

## 2025-06-03 ENCOUNTER — TELEPHONE (OUTPATIENT)
Age: 50
End: 2025-06-03

## 2025-06-03 DIAGNOSIS — M54.50 CHRONIC BILATERAL LOW BACK PAIN, UNSPECIFIED WHETHER SCIATICA PRESENT: Primary | ICD-10-CM

## 2025-06-03 DIAGNOSIS — G89.29 CHRONIC BILATERAL LOW BACK PAIN, UNSPECIFIED WHETHER SCIATICA PRESENT: Primary | ICD-10-CM

## 2025-06-03 RX ORDER — PREGABALIN 200 MG/1
200 CAPSULE ORAL 2 TIMES DAILY
Qty: 60 CAPSULE | Refills: 2 | Status: SHIPPED | OUTPATIENT
Start: 2025-06-03

## 2025-06-03 NOTE — TELEPHONE ENCOUNTER
Caller: jose Galeana    Doctor: Dr. Dover    Reason for call: pt called stating she stopped taking the gabapentin and went back on the lyrics.  She said the gabapentin was giving her jerky movements, nauseous and made her head feel kind of fuzzy.  Pt did not like the way the gabapentin made her feel and stopped taking it.  pt switched back to lyrica 2 times per day.  Can the dr please provide her a new script    Call back#: [phone]     [facility]/pharmacy #[id number] - [place] - [address] [place] [address] [phone]

## 2025-06-18 ENCOUNTER — TELEPHONE (OUTPATIENT)
Age: 50
End: 2025-06-18

## 2025-06-18 NOTE — TELEPHONE ENCOUNTER
Caller: jose Galeana    Doctor: Dr. Dover     Reason for call: pt has upcoming procedure scheduled for 6/23/25. Pt would like a call back to r/s.     Call back#: [phone]

## 2025-06-20 NOTE — TELEPHONE ENCOUNTER
Caller: Lissett    Doctor: Dr. Dover    Reason for call: please cancel procedure 06/23. Pt will call back to reschedule     Call back#: [phone]

## 2025-08-02 DIAGNOSIS — G89.29 CHRONIC BILATERAL LOW BACK PAIN, UNSPECIFIED WHETHER SCIATICA PRESENT: ICD-10-CM

## 2025-08-02 DIAGNOSIS — M54.50 CHRONIC BILATERAL LOW BACK PAIN, UNSPECIFIED WHETHER SCIATICA PRESENT: ICD-10-CM

## 2025-08-04 RX ORDER — CYCLOBENZAPRINE HCL 10 MG
10 TABLET ORAL 2 TIMES DAILY PRN
Qty: 60 TABLET | Refills: 2 | Status: SHIPPED | OUTPATIENT
Start: 2025-08-04